# Patient Record
Sex: FEMALE | Race: OTHER | HISPANIC OR LATINO | URBAN - METROPOLITAN AREA
[De-identification: names, ages, dates, MRNs, and addresses within clinical notes are randomized per-mention and may not be internally consistent; named-entity substitution may affect disease eponyms.]

---

## 2020-09-09 ENCOUNTER — OFFICE VISIT (OUTPATIENT)
Dept: URBAN - METROPOLITAN AREA CLINIC 63 | Facility: CLINIC | Age: 50
End: 2020-09-09

## 2021-04-12 ENCOUNTER — OFFICE VISIT (OUTPATIENT)
Dept: URBAN - METROPOLITAN AREA CLINIC 63 | Facility: CLINIC | Age: 51
End: 2021-04-12

## 2021-06-10 ENCOUNTER — OFFICE VISIT (OUTPATIENT)
Dept: URBAN - METROPOLITAN AREA SURGERY CENTER 4 | Facility: SURGERY CENTER | Age: 51
End: 2021-06-10

## 2021-06-23 ENCOUNTER — OFFICE VISIT (OUTPATIENT)
Dept: URBAN - METROPOLITAN AREA CLINIC 63 | Facility: CLINIC | Age: 51
End: 2021-06-23

## 2021-07-08 ENCOUNTER — OFFICE VISIT (OUTPATIENT)
Dept: URBAN - METROPOLITAN AREA SURGERY CENTER 4 | Facility: SURGERY CENTER | Age: 51
End: 2021-07-08

## 2021-07-12 LAB — PATHOLOGY (INDENTED REPORT): (no result)

## 2021-08-11 ENCOUNTER — OFFICE VISIT (OUTPATIENT)
Dept: URBAN - METROPOLITAN AREA CLINIC 63 | Facility: CLINIC | Age: 51
End: 2021-08-11

## 2022-06-21 ENCOUNTER — OFFICE VISIT (OUTPATIENT)
Dept: URBAN - METROPOLITAN AREA TELEHEALTH 2 | Facility: TELEHEALTH | Age: 52
End: 2022-06-21

## 2022-07-09 ENCOUNTER — TELEPHONE ENCOUNTER (OUTPATIENT)
Dept: URBAN - METROPOLITAN AREA CLINIC 121 | Facility: CLINIC | Age: 52
End: 2022-07-09

## 2022-07-09 RX ORDER — FLECAINIDE ACETATE 50 MG/1
TABLET ORAL
Refills: 0 | OUTPATIENT
Start: 2017-08-16 | End: 2017-11-20

## 2022-07-09 RX ORDER — GABAPENTIN 300 MG/1
CAPSULE ORAL
Refills: 0 | OUTPATIENT
Start: 2018-11-28 | End: 2019-03-26

## 2022-07-09 RX ORDER — BACLOFEN 10 MG/1
TABLET ORAL
Refills: 0 | OUTPATIENT
Start: 2015-05-18 | End: 2017-03-27

## 2022-07-09 RX ORDER — HYDROCODONE BITARTRATE AND ACETAMINOPHEN 7.5; 325 MG/1; MG/1
TABLET ORAL
Refills: 0 | OUTPATIENT
Start: 2014-10-08 | End: 2015-05-18

## 2022-07-09 RX ORDER — HYDROCODONE BITARTRATE AND ACETAMINOPHEN 7.5; 325 MG/1; MG/1
TABLET ORAL
Refills: 0 | OUTPATIENT
Start: 2017-03-27 | End: 2017-05-23

## 2022-07-09 RX ORDER — OMEPRAZOLE 40 MG/1
TWICE A DAY CAPSULE, DELAYED RELEASE ORAL TWICE A DAY
Refills: 2 | OUTPATIENT
Start: 2015-05-12 | End: 2015-05-18

## 2022-07-09 RX ORDER — DEXLANSOPRAZOLE 60 MG/1
ONCE A DAY CAPSULE, DELAYED RELEASE ORAL ONCE A DAY
Refills: 1 | OUTPATIENT
Start: 2021-04-12 | End: 2021-10-14

## 2022-07-09 RX ORDER — SACCHAROMYCES BOULARDII 250 MG
CAPSULE ORAL ONCE A DAY
Refills: 0 | OUTPATIENT
Start: 2014-10-08 | End: 2015-05-18

## 2022-07-09 RX ORDER — BACLOFEN 10 MG/1
TABLET ORAL
Refills: 0 | OUTPATIENT
Start: 2017-05-23 | End: 2017-08-16

## 2022-07-09 RX ORDER — FLECAINIDE ACETATE 50 MG/1
TABLET ORAL
Refills: 0 | OUTPATIENT
Start: 2017-05-23 | End: 2017-08-16

## 2022-07-09 RX ORDER — ACYCLOVIR 200 MG/1
CAPSULE ORAL
Refills: 0 | OUTPATIENT
Start: 2017-03-27 | End: 2017-05-23

## 2022-07-09 RX ORDER — HYDROCODONE BITARTRATE AND ACETAMINOPHEN 7.5; 325 MG/1; MG/1
TABLET ORAL
Refills: 0 | OUTPATIENT
Start: 2014-07-08 | End: 2014-10-08

## 2022-07-09 RX ORDER — ACYCLOVIR 200 MG/1
CAPSULE ORAL
Refills: 0 | OUTPATIENT
Start: 2017-05-23 | End: 2017-08-16

## 2022-07-09 RX ORDER — PREDNISONE 20 MG/1
TABLET ORAL
Refills: 0 | OUTPATIENT
Start: 2017-11-20 | End: 2018-11-28

## 2022-07-09 RX ORDER — CLINDAMYCIN PHOSPHATE 10 MG/ML
LOTION TOPICAL
Refills: 0 | OUTPATIENT
Start: 2014-03-07 | End: 2014-07-08

## 2022-07-09 RX ORDER — HYDROCODONE BITARTRATE AND ACETAMINOPHEN 7.5; 325 MG/1; MG/1
TABLET ORAL
Refills: 0 | OUTPATIENT
Start: 2017-08-16 | End: 2017-11-20

## 2022-07-09 RX ORDER — FLECAINIDE ACETATE 50 MG/1
TABLET ORAL
Refills: 0 | OUTPATIENT
Start: 2014-07-08 | End: 2014-10-08

## 2022-07-09 RX ORDER — HYDROCODONE BITARTRATE AND ACETAMINOPHEN 7.5; 325 MG/1; MG/1
TABLET ORAL
Refills: 0 | OUTPATIENT
Start: 2017-05-23 | End: 2017-08-16

## 2022-07-09 RX ORDER — FLECAINIDE ACETATE 50 MG/1
TABLET ORAL
Refills: 0 | OUTPATIENT
Start: 2014-03-07 | End: 2014-07-08

## 2022-07-09 RX ORDER — HYDROCODONE BITARTRATE AND ACETAMINOPHEN 7.5; 325 MG/1; MG/1
TABLET ORAL
Refills: 0 | OUTPATIENT
Start: 2014-03-07 | End: 2014-07-08

## 2022-07-09 RX ORDER — PREDNISONE 50 MG/1
TABLET ORAL
Refills: 0 | OUTPATIENT
Start: 2014-07-08 | End: 2014-10-08

## 2022-07-09 RX ORDER — LISINOPRIL 10 MG/1
TABLET ORAL
Refills: 0 | OUTPATIENT
Start: 2017-11-20 | End: 2018-11-28

## 2022-07-09 RX ORDER — OMEPRAZOLE 20 MG/1
ONCE A DAY CAPSULE, DELAYED RELEASE ORAL ONCE A DAY
Refills: 3 | OUTPATIENT
Start: 2018-03-22 | End: 2018-08-20

## 2022-07-09 RX ORDER — METHOCARBAMOL 750 MG/1
TABLET, FILM COATED ORAL TWICE A DAY
Refills: 0 | OUTPATIENT
Start: 2017-10-26 | End: 2018-11-28

## 2022-07-09 RX ORDER — LOSARTAN POTASSIUM 100 MG/1
TABLET, FILM COATED ORAL
Refills: 0 | OUTPATIENT
Start: 2019-01-14 | End: 2019-03-26

## 2022-07-09 RX ORDER — CITALOPRAM 10 MG/1
TABLET ORAL
Refills: 0 | OUTPATIENT
Start: 2014-03-07 | End: 2014-07-08

## 2022-07-09 RX ORDER — PREDNISONE 20 MG/1
TABLET ORAL
Refills: 0 | OUTPATIENT
Start: 2018-11-28 | End: 2019-03-26

## 2022-07-09 RX ORDER — DEXLANSOPRAZOLE 60 MG/1
TAKE 1 CAPSULE BY MOUTH EVERY DAY CAPSULE, DELAYED RELEASE ORAL
Refills: 0 | OUTPATIENT
Start: 2022-01-17 | End: 2022-05-02

## 2022-07-09 RX ORDER — LISINOPRIL 10 MG/1
TABLET ORAL
Refills: 0 | OUTPATIENT
Start: 2014-10-08 | End: 2015-05-18

## 2022-07-09 RX ORDER — PREDNISONE 50 MG/1
TABLET ORAL
Refills: 0 | OUTPATIENT
Start: 2014-10-08 | End: 2015-05-18

## 2022-07-09 RX ORDER — ONDANSETRON 4 MG/1
TABLET, ORALLY DISINTEGRATING ORAL
Refills: 0 | OUTPATIENT
Start: 2014-10-08 | End: 2015-05-18

## 2022-07-09 RX ORDER — CLINDAMYCIN PHOSPHATE 10 MG/ML
LOTION TOPICAL
Refills: 0 | OUTPATIENT
Start: 2014-07-08 | End: 2014-10-08

## 2022-07-09 RX ORDER — ACYCLOVIR 200 MG/1
CAPSULE ORAL
Refills: 0 | OUTPATIENT
Start: 2015-05-18 | End: 2017-03-27

## 2022-07-09 RX ORDER — SIMVASTATIN 20 MG/1
TABLET, FILM COATED ORAL
Refills: 0 | OUTPATIENT
Start: 2018-11-28 | End: 2019-03-26

## 2022-07-09 RX ORDER — INSULIN GLARGINE 100 [IU]/ML
24N UNIT DAILY INJECTION, SOLUTION SUBCUTANEOUS
Refills: 0 | OUTPATIENT
Start: 2018-11-28 | End: 2019-03-26

## 2022-07-09 RX ORDER — MONTELUKAST 10 MG/1
TABLET, FILM COATED ORAL
Refills: 0 | OUTPATIENT
Start: 2014-10-08 | End: 2015-05-18

## 2022-07-09 RX ORDER — ONDANSETRON 4 MG/1
TABLET, ORALLY DISINTEGRATING ORAL
Refills: 0 | OUTPATIENT
Start: 2014-03-07 | End: 2014-07-08

## 2022-07-09 RX ORDER — DEXLANSOPRAZOLE 60 MG/1
TAKE 1 CAPSULE BY MOUTH EVERY DAY CAPSULE, DELAYED RELEASE ORAL ONCE A DAY
Refills: 1 | OUTPATIENT
Start: 2021-10-14 | End: 2021-11-18

## 2022-07-09 RX ORDER — LISINOPRIL 10 MG/1
TABLET ORAL
Refills: 0 | OUTPATIENT
Start: 2014-07-08 | End: 2014-10-08

## 2022-07-09 RX ORDER — MONTELUKAST 10 MG/1
TABLET, FILM COATED ORAL
Refills: 0 | OUTPATIENT
Start: 2018-11-28 | End: 2019-03-26

## 2022-07-09 RX ORDER — OMEPRAZOLE 20 MG/1
TAKE ONE CAPSULE BY MOUTH ONCE A DAY CAPSULE, DELAYED RELEASE ORAL
Refills: 0 | OUTPATIENT
Start: 2018-09-14 | End: 2018-10-14

## 2022-07-09 RX ORDER — LISINOPRIL 10 MG/1
TABLET ORAL
Refills: 0 | OUTPATIENT
Start: 2017-03-27 | End: 2017-05-23

## 2022-07-09 RX ORDER — OMEPRAZOLE 40 MG/1
CAPSULE, DELAYED RELEASE ORAL TWICE A DAY
Refills: 2 | OUTPATIENT
Start: 2014-05-27 | End: 2014-07-08

## 2022-07-09 RX ORDER — FLECAINIDE ACETATE 50 MG/1
TABLET ORAL
Refills: 0 | OUTPATIENT
Start: 2017-11-20 | End: 2018-11-28

## 2022-07-09 RX ORDER — DEXLANSOPRAZOLE 60 MG/1
ONCE A DAY CAPSULE, DELAYED RELEASE ORAL ONCE A DAY
Refills: 3 | OUTPATIENT
Start: 2019-03-26 | End: 2019-08-13

## 2022-07-09 RX ORDER — MONTELUKAST 10 MG/1
TABLET, FILM COATED ORAL
Refills: 0 | OUTPATIENT
Start: 2017-05-23 | End: 2017-08-16

## 2022-07-09 RX ORDER — OMEPRAZOLE 40 MG/1
TWICE A DAY CAPSULE, DELAYED RELEASE ORAL TWICE A DAY
Refills: 2 | OUTPATIENT
Start: 2015-05-18 | End: 2017-03-27

## 2022-07-09 RX ORDER — ISOSORBIDE DINITRATE 10 MG/1
TABLET ORAL
Refills: 0 | OUTPATIENT
Start: 2017-03-27 | End: 2017-05-23

## 2022-07-09 RX ORDER — ALPRAZOLAM 0.25 MG
TABLET ORAL
Refills: 0 | OUTPATIENT
Start: 2018-11-28 | End: 2019-03-26

## 2022-07-09 RX ORDER — LISINOPRIL 10 MG/1
TABLET ORAL
Refills: 0 | OUTPATIENT
Start: 2017-05-23 | End: 2017-08-16

## 2022-07-09 RX ORDER — BACLOFEN 10 MG/1
TABLET ORAL
Refills: 0 | OUTPATIENT
Start: 2014-03-07 | End: 2014-07-08

## 2022-07-09 RX ORDER — LISINOPRIL 10 MG/1
TABLET ORAL
Refills: 0 | OUTPATIENT
Start: 2017-08-16 | End: 2017-11-20

## 2022-07-09 RX ORDER — GLIMEPIRIDE 2 MG/1
TABLET ORAL
Refills: 0 | OUTPATIENT
Start: 2017-11-20 | End: 2018-11-28

## 2022-07-09 RX ORDER — ISOSORBIDE DINITRATE 10 MG/1
TABLET ORAL
Refills: 0 | OUTPATIENT
Start: 2017-05-23 | End: 2017-08-16

## 2022-07-09 RX ORDER — PAROXETINE HYDROCHLORIDE 20 MG/1
TABLET, FILM COATED ORAL
Refills: 0 | OUTPATIENT
Start: 2018-11-28 | End: 2019-03-26

## 2022-07-09 RX ORDER — AMOXICILLIN 500 MG/1
TAKE 2 TABLETS TWICE A DAY FOR 14 DAYS TABLET, FILM COATED ORAL TAKE AS DIRECTED
Refills: 0 | OUTPATIENT
Start: 2014-05-27 | End: 2014-07-08

## 2022-07-09 RX ORDER — HYDROCHLOROTHIAZIDE 12.5 MG/1
TABLET ORAL
Refills: 0 | OUTPATIENT
Start: 2018-11-16 | End: 2019-03-26

## 2022-07-09 RX ORDER — OMEPRAZOLE 20 MG/1
TAKE ONE CAPSULE BY MOUTH ONCE A DAY CAPSULE, DELAYED RELEASE ORAL
Refills: 0 | OUTPATIENT
Start: 2018-08-20 | End: 2018-09-14

## 2022-07-09 RX ORDER — METHOCARBAMOL 750 MG/1
TABLET, FILM COATED ORAL
Refills: 0 | OUTPATIENT
Start: 2018-02-15 | End: 2019-03-26

## 2022-07-09 RX ORDER — TRAZODONE HYDROCHLORIDE 100 MG/1
TABLET ORAL
Refills: 0 | OUTPATIENT
Start: 2014-07-08 | End: 2014-10-08

## 2022-07-09 RX ORDER — METFORMIN HCL 500 MG/1
TABLET ORAL TWICE A DAY
Refills: 0 | OUTPATIENT
Start: 2017-05-23 | End: 2017-08-16

## 2022-07-09 RX ORDER — DICLOFENAC SODIUM 1 %
GEL (GRAM) TOPICAL TWICE A DAY
Refills: 0 | OUTPATIENT
Start: 2017-10-27 | End: 2018-11-28

## 2022-07-09 RX ORDER — GLIMEPIRIDE 2 MG/1
TABLET ORAL
Refills: 0 | OUTPATIENT
Start: 2017-08-16 | End: 2017-11-20

## 2022-07-09 RX ORDER — DOXYCYCLINE HYCLATE 100 MG/1
CAPSULE ORAL
Refills: 0 | OUTPATIENT
Start: 2017-10-13 | End: 2018-11-28

## 2022-07-09 RX ORDER — SUCRALFATE 1 G/1
TABLET ORAL
Refills: 0 | OUTPATIENT
Start: 2018-11-28 | End: 2019-03-26

## 2022-07-09 RX ORDER — PREDNISONE 50 MG/1
TABLET ORAL
Refills: 0 | OUTPATIENT
Start: 2015-05-18 | End: 2017-03-27

## 2022-07-09 RX ORDER — CITALOPRAM 10 MG/1
TABLET ORAL
Refills: 0 | OUTPATIENT
Start: 2015-05-18 | End: 2017-03-27

## 2022-07-09 RX ORDER — ISOSORBIDE DINITRATE 10 MG/1
TABLET ORAL
Refills: 0 | OUTPATIENT
Start: 2014-10-08 | End: 2015-05-18

## 2022-07-09 RX ORDER — PREDNISONE 20 MG/1
TABLET ORAL
Refills: 0 | OUTPATIENT
Start: 2017-03-27 | End: 2017-05-23

## 2022-07-09 RX ORDER — OMEPRAZOLE 40 MG/1
CAPSULE, DELAYED RELEASE ORAL TWICE A DAY
Refills: 2 | OUTPATIENT
Start: 2014-07-08 | End: 2015-05-12

## 2022-07-09 RX ORDER — HYDROCODONE BITARTRATE AND ACETAMINOPHEN 7.5; 325 MG/1; MG/1
TABLET ORAL
Refills: 0 | OUTPATIENT
Start: 2017-11-20 | End: 2018-11-28

## 2022-07-09 RX ORDER — METFORMIN HCL 500 MG/1
TABLET ORAL TWICE A DAY
Refills: 0 | OUTPATIENT
Start: 2017-11-20 | End: 2018-11-28

## 2022-07-09 RX ORDER — GLIMEPIRIDE 2 MG/1
TABLET ORAL
Refills: 0 | OUTPATIENT
Start: 2017-05-23 | End: 2017-08-16

## 2022-07-09 RX ORDER — MONTELUKAST 10 MG/1
TABLET, FILM COATED ORAL
Refills: 0 | OUTPATIENT
Start: 2014-07-08 | End: 2014-10-08

## 2022-07-09 RX ORDER — GLIMEPIRIDE 2 MG/1
TABLET ORAL
Refills: 0 | OUTPATIENT
Start: 2014-03-07 | End: 2014-07-08

## 2022-07-09 RX ORDER — FLECAINIDE ACETATE 50 MG/1
TABLET ORAL
Refills: 0 | OUTPATIENT
Start: 2018-11-28 | End: 2019-03-26

## 2022-07-09 RX ORDER — NITROGLYCERIN 0.4 MG/1
PRN TABLET SUBLINGUAL
Refills: 0 | OUTPATIENT
Start: 2018-11-28 | End: 2019-03-26

## 2022-07-09 RX ORDER — ALPRAZOLAM 0.25 MG
TABLET ORAL
Refills: 0 | OUTPATIENT
Start: 2019-03-26 | End: 2020-09-09

## 2022-07-09 RX ORDER — OMEPRAZOLE 40 MG/1
CAPSULE, DELAYED RELEASE ORAL TWICE A DAY
Refills: 0 | OUTPATIENT
Start: 2018-11-26 | End: 2018-11-28

## 2022-07-09 RX ORDER — MULTIVITAMIN
TABLET ORAL
Refills: 0 | OUTPATIENT
Start: 2019-03-26 | End: 2020-09-09

## 2022-07-09 RX ORDER — GABAPENTIN 300 MG/1
CAPSULE ORAL
Refills: 0 | OUTPATIENT
Start: 2014-03-07 | End: 2014-07-08

## 2022-07-09 RX ORDER — PANTOPRAZOLE SODIUM 40 MG/1
TABLET, DELAYED RELEASE ORAL TWICE A DAY
Refills: 1 | OUTPATIENT
Start: 2014-10-08 | End: 2015-04-07

## 2022-07-09 RX ORDER — ALBUTEROL SULFATE 2.5 MG/3ML
SOLUTION RESPIRATORY (INHALATION)
Refills: 0 | OUTPATIENT
Start: 2018-11-28 | End: 2019-03-26

## 2022-07-09 RX ORDER — CITALOPRAM 10 MG/1
TABLET ORAL
Refills: 0 | OUTPATIENT
Start: 2014-10-08 | End: 2015-05-18

## 2022-07-09 RX ORDER — ISOSORBIDE DINITRATE 10 MG/1
TABLET ORAL
Refills: 0 | OUTPATIENT
Start: 2014-03-07 | End: 2014-07-08

## 2022-07-09 RX ORDER — GLIMEPIRIDE 2 MG/1
TABLET ORAL
Refills: 0 | OUTPATIENT
Start: 2015-05-18 | End: 2017-03-27

## 2022-07-09 RX ORDER — PREDNISONE 20 MG/1
TABLET ORAL
Refills: 0 | OUTPATIENT
Start: 2017-08-16 | End: 2017-11-20

## 2022-07-09 RX ORDER — DEXLANSOPRAZOLE 60 MG/1
TAKE 1 CAPSULE BY MOUTH EVERY DAY CAPSULE, DELAYED RELEASE ORAL
Refills: 0 | OUTPATIENT
Start: 2021-11-18 | End: 2022-01-17

## 2022-07-09 RX ORDER — ISOSORBIDE DINITRATE 10 MG/1
TABLET ORAL
Refills: 0 | OUTPATIENT
Start: 2015-05-18 | End: 2017-03-27

## 2022-07-09 RX ORDER — DEXLANSOPRAZOLE 60 MG/1
CAPSULE, DELAYED RELEASE ORAL ONCE A DAY
Refills: 2 | OUTPATIENT
Start: 2014-03-07 | End: 2014-07-08

## 2022-07-09 RX ORDER — SACCHAROMYCES BOULARDII 250 MG
CAPSULE ORAL ONCE A DAY
Refills: 1 | OUTPATIENT
Start: 2014-12-29 | End: 2015-05-18

## 2022-07-09 RX ORDER — DEXLANSOPRAZOLE 60 MG/1
ONCE A DAY CAPSULE, DELAYED RELEASE ORAL ONCE A DAY
Refills: 3 | OUTPATIENT
Start: 2018-11-28 | End: 2019-03-26

## 2022-07-09 RX ORDER — MONTELUKAST 10 MG/1
TABLET, FILM COATED ORAL
Refills: 0 | OUTPATIENT
Start: 2014-03-07 | End: 2014-07-08

## 2022-07-09 RX ORDER — CLINDAMYCIN PHOSPHATE 10 MG/ML
LOTION TOPICAL
Refills: 0 | OUTPATIENT
Start: 2014-10-08 | End: 2015-05-18

## 2022-07-09 RX ORDER — HYDROCODONE BITARTRATE AND ACETAMINOPHEN 7.5; 325 MG/1; MG/1
TABLET ORAL
Refills: 0 | OUTPATIENT
Start: 2015-05-18 | End: 2017-03-27

## 2022-07-09 RX ORDER — METFORMIN HCL 500 MG/1
TABLET ORAL TWICE A DAY
Refills: 0 | OUTPATIENT
Start: 2017-03-27 | End: 2017-05-23

## 2022-07-09 RX ORDER — CLARITHROMYCIN 500 MG/1
TABLET ORAL TWICE A DAY
Refills: 0 | OUTPATIENT
Start: 2014-05-27 | End: 2014-07-08

## 2022-07-09 RX ORDER — CLOTRIMAZOLE 10 MG/G
CREAM TOPICAL TWICE A DAY
Refills: 0 | OUTPATIENT
Start: 2018-11-28 | End: 2019-03-26

## 2022-07-09 RX ORDER — GABAPENTIN 300 MG/1
CAPSULE ORAL
Refills: 0 | OUTPATIENT
Start: 2015-05-18 | End: 2017-03-27

## 2022-07-09 RX ORDER — BACLOFEN 10 MG/1
TABLET ORAL
Refills: 0 | OUTPATIENT
Start: 2017-03-27 | End: 2017-05-23

## 2022-07-09 RX ORDER — ACYCLOVIR 200 MG/1
CAPSULE ORAL
Refills: 0 | OUTPATIENT
Start: 2017-08-16 | End: 2017-08-16

## 2022-07-09 RX ORDER — LISINOPRIL 10 MG/1
TABLET ORAL
Refills: 0 | OUTPATIENT
Start: 2014-03-07 | End: 2014-07-08

## 2022-07-09 RX ORDER — SUCRALFATE 1 G/1
TWICE A DAY TABLET ORAL TWICE A DAY
Refills: 3 | OUTPATIENT
Start: 2017-05-23 | End: 2017-08-16

## 2022-07-09 RX ORDER — GLIMEPIRIDE 2 MG/1
TABLET ORAL
Refills: 0 | OUTPATIENT
Start: 2014-10-08 | End: 2015-05-18

## 2022-07-09 RX ORDER — OMEPRAZOLE 20 MG/1
PRN CAPSULE, DELAYED RELEASE ORAL
Refills: 0 | OUTPATIENT
Start: 2017-03-27 | End: 2017-05-23

## 2022-07-09 RX ORDER — LISINOPRIL 10 MG/1
TABLET ORAL
Refills: 0 | OUTPATIENT
Start: 2018-11-28 | End: 2019-03-26

## 2022-07-09 RX ORDER — BACLOFEN 10 MG/1
TABLET ORAL
Refills: 0 | OUTPATIENT
Start: 2014-10-08 | End: 2015-05-18

## 2022-07-09 RX ORDER — FLECAINIDE ACETATE 50 MG/1
TABLET ORAL
Refills: 0 | OUTPATIENT
Start: 2014-10-08 | End: 2015-05-18

## 2022-07-09 RX ORDER — GABAPENTIN 300 MG/1
CAPSULE ORAL
Refills: 0 | OUTPATIENT
Start: 2014-07-08 | End: 2014-10-08

## 2022-07-09 RX ORDER — FAMOTIDINE 20 MG/1
TABLET ORAL TWICE A DAY
Refills: 0 | OUTPATIENT
Start: 2018-11-28 | End: 2019-03-26

## 2022-07-09 RX ORDER — METFORMIN HCL 500 MG/1
TABLET ORAL TWICE A DAY
Refills: 0 | OUTPATIENT
Start: 2017-08-16 | End: 2017-11-20

## 2022-07-09 RX ORDER — ACYCLOVIR 200 MG/1
CAPSULE ORAL
Refills: 0 | OUTPATIENT
Start: 2014-03-07 | End: 2014-07-08

## 2022-07-09 RX ORDER — ACYCLOVIR 200 MG/1
FIVE TIMES A DAY CAPSULE ORAL
Refills: 0 | OUTPATIENT
Start: 2018-11-28 | End: 2019-03-26

## 2022-07-09 RX ORDER — MONTELUKAST 10 MG/1
TABLET, FILM COATED ORAL
Refills: 0 | OUTPATIENT
Start: 2017-08-16 | End: 2018-11-28

## 2022-07-09 RX ORDER — FLECAINIDE ACETATE 50 MG/1
TABLET ORAL
Refills: 0 | OUTPATIENT
Start: 2015-05-18 | End: 2017-03-27

## 2022-07-09 RX ORDER — TRAZODONE HYDROCHLORIDE 100 MG/1
TABLET ORAL
Refills: 0 | OUTPATIENT
Start: 2015-05-18 | End: 2017-03-27

## 2022-07-09 RX ORDER — METOPROLOL TARTRATE 25 MG/1
TABLET, FILM COATED ORAL TWICE A DAY
Refills: 0 | OUTPATIENT
Start: 2018-11-28 | End: 2019-03-26

## 2022-07-09 RX ORDER — PANTOPRAZOLE SODIUM 40 MG/1
TABLET, DELAYED RELEASE ORAL
Refills: 0 | OUTPATIENT
Start: 2019-03-26 | End: 2019-03-26

## 2022-07-09 RX ORDER — GABAPENTIN 300 MG/1
CAPSULE ORAL
Refills: 0 | OUTPATIENT
Start: 2014-10-08 | End: 2015-05-18

## 2022-07-09 RX ORDER — PANTOPRAZOLE SODIUM 40 MG/1
TWICE A DAY TABLET, DELAYED RELEASE ORAL TWICE A DAY
Refills: 1 | OUTPATIENT
Start: 2015-04-07 | End: 2015-05-18

## 2022-07-09 RX ORDER — OMEPRAZOLE 40 MG/1
CAPSULE, DELAYED RELEASE ORAL TWICE A DAY
Refills: 0 | OUTPATIENT
Start: 2017-11-20 | End: 2018-11-26

## 2022-07-09 RX ORDER — BACLOFEN 10 MG/1
TABLET ORAL
Refills: 0 | OUTPATIENT
Start: 2014-07-08 | End: 2014-10-08

## 2022-07-09 RX ORDER — ISOSORBIDE DINITRATE 10 MG/1
TABLET ORAL
Refills: 0 | OUTPATIENT
Start: 2017-08-16 | End: 2017-11-20

## 2022-07-09 RX ORDER — OMEPRAZOLE 20 MG/1
ONCE A DAY CAPSULE, DELAYED RELEASE ORAL ONCE A DAY
Refills: 2 | OUTPATIENT
Start: 2015-07-10 | End: 2015-10-12

## 2022-07-09 RX ORDER — CITALOPRAM 10 MG/1
TABLET ORAL
Refills: 0 | OUTPATIENT
Start: 2014-07-08 | End: 2014-10-08

## 2022-07-09 RX ORDER — MONTELUKAST 10 MG/1
TABLET, FILM COATED ORAL
Refills: 0 | OUTPATIENT
Start: 2015-05-18 | End: 2017-05-23

## 2022-07-09 RX ORDER — SUCRALFATE 1 G/1
TABLET ORAL TWICE A DAY
Refills: 0 | OUTPATIENT
Start: 2017-08-16 | End: 2017-11-20

## 2022-07-09 RX ORDER — LISINOPRIL 10 MG/1
TABLET ORAL
Refills: 0 | OUTPATIENT
Start: 2015-05-18 | End: 2017-03-27

## 2022-07-09 RX ORDER — ONDANSETRON 4 MG/1
TABLET, ORALLY DISINTEGRATING ORAL
Refills: 0 | OUTPATIENT
Start: 2015-05-18 | End: 2017-03-27

## 2022-07-09 RX ORDER — TRAZODONE HYDROCHLORIDE 100 MG/1
TABLET ORAL
Refills: 0 | OUTPATIENT
Start: 2014-03-07 | End: 2014-07-08

## 2022-07-09 RX ORDER — BACLOFEN 10 MG/1
TABLET ORAL
Refills: 0 | OUTPATIENT
Start: 2017-08-16 | End: 2017-11-20

## 2022-07-09 RX ORDER — PREDNISONE 50 MG/1
TABLET ORAL
Refills: 0 | OUTPATIENT
Start: 2014-03-07 | End: 2014-07-08

## 2022-07-09 RX ORDER — GLIMEPIRIDE 2 MG/1
TABLET ORAL
Refills: 0 | OUTPATIENT
Start: 2014-07-08 | End: 2014-10-08

## 2022-07-09 RX ORDER — TRAZODONE HYDROCHLORIDE 100 MG/1
TABLET ORAL
Refills: 0 | OUTPATIENT
Start: 2014-10-08 | End: 2015-05-18

## 2022-07-09 RX ORDER — ISOSORBIDE DINITRATE 10 MG/1
TABLET ORAL
Refills: 0 | OUTPATIENT
Start: 2014-07-08 | End: 2014-10-08

## 2022-07-09 RX ORDER — PREDNISONE 20 MG/1
TABLET ORAL
Refills: 0 | OUTPATIENT
Start: 2017-05-23 | End: 2017-08-16

## 2022-07-09 RX ORDER — HYDROCHLOROTHIAZIDE 12.5 MG/1
TABLET ORAL
Refills: 0 | OUTPATIENT
Start: 2018-11-28 | End: 2019-03-26

## 2022-07-09 RX ORDER — OMEPRAZOLE 20 MG/1
CAPSULE, DELAYED RELEASE ORAL TWICE A DAY
Refills: 2 | OUTPATIENT
Start: 2014-12-29 | End: 2015-04-06

## 2022-07-09 RX ORDER — OMEPRAZOLE 20 MG/1
PRN CAPSULE, DELAYED RELEASE ORAL
Refills: 0 | OUTPATIENT
Start: 2017-08-16 | End: 2017-11-20

## 2022-07-09 RX ORDER — BACLOFEN 10 MG/1
TABLET ORAL
Refills: 0 | OUTPATIENT
Start: 2017-11-20 | End: 2018-11-28

## 2022-07-09 RX ORDER — OMEPRAZOLE 20 MG/1
PRN CAPSULE, DELAYED RELEASE ORAL
Refills: 0 | OUTPATIENT
Start: 2017-05-23 | End: 2017-08-16

## 2022-07-09 RX ORDER — OXYCODONE AND ACETAMINOPHEN 325; 10 MG/1; MG/1
TABLET ORAL
Refills: 0 | OUTPATIENT
Start: 2018-11-28 | End: 2019-03-26

## 2022-07-09 RX ORDER — INSULIN ASPART 100 [IU]/ML
PRN INJECTION, SOLUTION INTRAVENOUS; SUBCUTANEOUS
Refills: 0 | OUTPATIENT
Start: 2018-11-28 | End: 2019-03-26

## 2022-07-09 RX ORDER — GLIMEPIRIDE 2 MG/1
TABLET ORAL
Refills: 0 | OUTPATIENT
Start: 2017-03-27 | End: 2017-05-23

## 2022-07-09 RX ORDER — ONDANSETRON 4 MG/1
TABLET, ORALLY DISINTEGRATING ORAL
Refills: 0 | OUTPATIENT
Start: 2014-07-08 | End: 2014-10-08

## 2022-07-09 RX ORDER — SUCRALFATE 1 G/1
TABLET ORAL
Refills: 0 | OUTPATIENT
Start: 2014-07-08 | End: 2015-05-18

## 2022-07-09 RX ORDER — FLECAINIDE ACETATE 50 MG/1
TABLET ORAL
Refills: 0 | OUTPATIENT
Start: 2017-03-27 | End: 2017-05-23

## 2022-07-09 RX ORDER — OMEPRAZOLE 20 MG/1
ONCE A DAY CAPSULE, DELAYED RELEASE ORAL ONCE A DAY
Refills: 2 | OUTPATIENT
Start: 2015-10-12 | End: 2017-03-27

## 2022-07-10 ENCOUNTER — TELEPHONE ENCOUNTER (OUTPATIENT)
Dept: URBAN - METROPOLITAN AREA CLINIC 121 | Facility: CLINIC | Age: 52
End: 2022-07-10

## 2022-07-10 RX ORDER — FAMOTIDINE 20 MG/1
TABLET ORAL TWICE A DAY
Refills: 0 | Status: ACTIVE | COMMUNITY
Start: 2019-03-26

## 2022-07-10 RX ORDER — SIMVASTATIN 20 MG/1
TABLET, FILM COATED ORAL
Refills: 0 | Status: ACTIVE | COMMUNITY
Start: 2019-03-26

## 2022-07-10 RX ORDER — ESOMEPRAZOLE MAGNESIUM 40 MG/1
ONCE A DAY CAPSULE, DELAYED RELEASE ORAL ONCE A DAY
Refills: 0 | Status: ACTIVE | COMMUNITY
Start: 2022-05-02

## 2022-07-10 RX ORDER — DEXLANSOPRAZOLE 60 MG/1
TAKE 1 CAPSULE BY MOUTH EVERY DAY CAPSULE, DELAYED RELEASE ORAL
Refills: 0 | Status: ACTIVE | COMMUNITY
Start: 2019-10-24

## 2022-07-10 RX ORDER — FLUCONAZOLE 100 MG/1
ONCE A DAY TABLET ORAL ONCE A DAY
Refills: 0 | Status: ACTIVE | COMMUNITY
Start: 2017-05-23

## 2022-07-10 RX ORDER — OMEPRAZOLE 40 MG/1
ONCE A DAY CAPSULE, DELAYED RELEASE ORAL ONCE A DAY
Refills: 2 | Status: ACTIVE | COMMUNITY
Start: 2022-06-21

## 2022-07-10 RX ORDER — DEXLANSOPRAZOLE 60 MG/1
TAKE 1 CAPSULE BY MOUTH EVERY DAY CAPSULE, DELAYED RELEASE ORAL
Refills: 0 | Status: ACTIVE | COMMUNITY
Start: 2019-08-13

## 2022-07-10 RX ORDER — CLOTRIMAZOLE 10 MG/G
CREAM TOPICAL TWICE A DAY
Refills: 0 | Status: ACTIVE | COMMUNITY
Start: 2019-03-26

## 2022-07-10 RX ORDER — METHOCARBAMOL 750 MG/1
TABLET, FILM COATED ORAL
Refills: 0 | Status: ACTIVE | COMMUNITY
Start: 2019-03-26

## 2022-07-10 RX ORDER — SUCRALFATE 1 G/1
TWICE A DAY TABLET ORAL TWICE A DAY
Refills: 0 | Status: ACTIVE | COMMUNITY
Start: 2017-08-16

## 2022-07-10 RX ORDER — INSULIN ASPART 100 [IU]/ML
PRN INJECTION, SOLUTION INTRAVENOUS; SUBCUTANEOUS
Refills: 0 | Status: ACTIVE | COMMUNITY
Start: 2019-03-26

## 2022-07-10 RX ORDER — FAMOTIDINE 10 MG
ONCE A DAY TABLET ORAL ONCE A DAY
Refills: 3 | Status: ACTIVE | COMMUNITY
Start: 2019-03-26

## 2022-07-10 RX ORDER — DEXLANSOPRAZOLE 30 MG/1
ONCE A DAY CAPSULE, DELAYED RELEASE ORAL ONCE A DAY
Refills: 1 | Status: ACTIVE | COMMUNITY
Start: 2020-09-09

## 2022-07-10 RX ORDER — FLECAINIDE ACETATE 50 MG/1
TABLET ORAL
Refills: 0 | Status: ACTIVE | COMMUNITY
Start: 2019-03-26

## 2022-07-10 RX ORDER — DEXLANSOPRAZOLE 60 MG/1
ONCE A DAY CAPSULE, DELAYED RELEASE ORAL ONCE A DAY
Refills: 0 | Status: ACTIVE | COMMUNITY
Start: 2022-05-02

## 2022-07-10 RX ORDER — LOSARTAN POTASSIUM 100 MG/1
TABLET, FILM COATED ORAL
Refills: 0 | Status: ACTIVE | COMMUNITY
Start: 2019-03-26

## 2022-07-10 RX ORDER — INSULIN GLARGINE 100 [IU]/ML
24N UNIT DAILY INJECTION, SOLUTION SUBCUTANEOUS
Refills: 0 | Status: ACTIVE | COMMUNITY
Start: 2019-03-26

## 2022-07-10 RX ORDER — METHOCARBAMOL 750 MG/1
TABLET, FILM COATED ORAL TWICE A DAY
Refills: 0 | Status: ACTIVE | COMMUNITY
Start: 2018-11-28

## 2022-07-10 RX ORDER — FLUCONAZOLE 200 MG/1
ONCE A DAY TABLET ORAL ONCE A DAY
Refills: 0 | Status: ACTIVE | COMMUNITY
Start: 2017-05-23

## 2022-07-10 RX ORDER — OMEPRAZOLE 20 MG/1
ONCE A DAY CAPSULE, DELAYED RELEASE ORAL ONCE A DAY
Refills: 0 | Status: ACTIVE | COMMUNITY
Start: 2022-05-03

## 2022-07-10 RX ORDER — GABAPENTIN 300 MG/1
CAPSULE ORAL
Refills: 0 | Status: ACTIVE | COMMUNITY
Start: 2019-03-26

## 2022-07-10 RX ORDER — PREDNISONE 20 MG/1
TABLET ORAL
Refills: 0 | Status: ACTIVE | COMMUNITY
Start: 2019-03-26

## 2022-07-10 RX ORDER — ACYCLOVIR 200 MG/1
FIVE TIMES A DAY CAPSULE ORAL
Refills: 0 | Status: ACTIVE | COMMUNITY
Start: 2019-03-26

## 2022-07-10 RX ORDER — NITROGLYCERIN 0.4 MG/1
PRN TABLET SUBLINGUAL
Refills: 0 | Status: ACTIVE | COMMUNITY
Start: 2019-03-26

## 2022-07-10 RX ORDER — PAROXETINE HYDROCHLORIDE 20 MG/1
TABLET, FILM COATED ORAL
Refills: 0 | Status: ACTIVE | COMMUNITY
Start: 2019-03-26

## 2022-07-10 RX ORDER — MONTELUKAST 10 MG/1
TABLET, FILM COATED ORAL
Refills: 0 | Status: ACTIVE | COMMUNITY
Start: 2019-03-26

## 2022-07-10 RX ORDER — ALBUTEROL SULFATE 2.5 MG/3ML
SOLUTION RESPIRATORY (INHALATION)
Refills: 0 | Status: ACTIVE | COMMUNITY
Start: 2019-03-26

## 2022-07-10 RX ORDER — OMEPRAZOLE 20 MG/1
TAKE ONE CAPSULE BY MOUTH ONCE A DAY CAPSULE, DELAYED RELEASE ORAL
Refills: 0 | Status: ACTIVE | COMMUNITY
Start: 2018-10-14

## 2022-07-10 RX ORDER — OXYCODONE AND ACETAMINOPHEN 325; 10 MG/1; MG/1
TABLET ORAL
Refills: 0 | Status: ACTIVE | COMMUNITY
Start: 2019-03-26

## 2022-07-10 RX ORDER — HYDROCHLOROTHIAZIDE 12.5 MG/1
TABLET ORAL
Refills: 0 | Status: ACTIVE | COMMUNITY
Start: 2019-03-26

## 2022-07-27 ENCOUNTER — TELEPHONE ENCOUNTER (OUTPATIENT)
Dept: URBAN - METROPOLITAN AREA CLINIC 63 | Facility: CLINIC | Age: 52
End: 2022-07-27

## 2022-08-23 ENCOUNTER — OFFICE VISIT (OUTPATIENT)
Dept: URBAN - METROPOLITAN AREA TELEHEALTH 1 | Facility: TELEHEALTH | Age: 52
End: 2022-08-23
Payer: COMMERCIAL

## 2022-08-23 DIAGNOSIS — K29.60 REFLUX GASTRITIS: ICD-10-CM

## 2022-08-23 DIAGNOSIS — Z86.010 PERSONAL HISTORY OF COLONIC POLYPS: ICD-10-CM

## 2022-08-23 DIAGNOSIS — D12.6 BENIGN NEOPLASM OF COLON, UNSPECIFIED: ICD-10-CM

## 2022-08-23 PROCEDURE — 99213 OFFICE O/P EST LOW 20 MIN: CPT | Performed by: INTERNAL MEDICINE

## 2022-08-23 RX ORDER — MONTELUKAST 10 MG/1
TABLET, FILM COATED ORAL
Refills: 0 | Status: ACTIVE | COMMUNITY
Start: 2019-03-26

## 2022-08-23 RX ORDER — METHOCARBAMOL 750 MG/1
TABLET, FILM COATED ORAL TWICE A DAY
Refills: 0 | Status: ACTIVE | COMMUNITY
Start: 2018-11-28

## 2022-08-23 RX ORDER — OMEPRAZOLE 40 MG/1
1 CAPSULE 30 MINUTES BEFORE MORNING MEAL CAPSULE, DELAYED RELEASE ORAL
Qty: 60 | Refills: 1 | OUTPATIENT

## 2022-08-23 RX ORDER — ALBUTEROL SULFATE 2.5 MG/3ML
SOLUTION RESPIRATORY (INHALATION)
Refills: 0 | Status: ACTIVE | COMMUNITY
Start: 2019-03-26

## 2022-08-23 RX ORDER — SIMVASTATIN 20 MG/1
TABLET, FILM COATED ORAL
Refills: 0 | Status: ACTIVE | COMMUNITY
Start: 2019-03-26

## 2022-08-23 RX ORDER — PREDNISONE 20 MG/1
TABLET ORAL
Refills: 0 | Status: ACTIVE | COMMUNITY
Start: 2019-03-26

## 2022-08-23 RX ORDER — PAROXETINE HYDROCHLORIDE 20 MG/1
TABLET, FILM COATED ORAL
Refills: 0 | Status: ACTIVE | COMMUNITY
Start: 2019-03-26

## 2022-08-23 RX ORDER — LOSARTAN POTASSIUM 100 MG/1
TABLET, FILM COATED ORAL
Refills: 0 | Status: ACTIVE | COMMUNITY
Start: 2019-03-26

## 2022-08-23 RX ORDER — NITROGLYCERIN 0.4 MG/1
PRN TABLET SUBLINGUAL
Refills: 0 | Status: ACTIVE | COMMUNITY
Start: 2019-03-26

## 2022-08-23 RX ORDER — FLECAINIDE ACETATE 50 MG/1
TABLET ORAL
Refills: 0 | Status: ACTIVE | COMMUNITY
Start: 2019-03-26

## 2022-08-23 RX ORDER — OXYCODONE AND ACETAMINOPHEN 325; 10 MG/1; MG/1
TABLET ORAL
Refills: 0 | Status: ACTIVE | COMMUNITY
Start: 2019-03-26

## 2022-08-23 RX ORDER — OMEPRAZOLE 40 MG/1
ONCE A DAY CAPSULE, DELAYED RELEASE ORAL ONCE A DAY
Refills: 2 | Status: ACTIVE | COMMUNITY
Start: 2022-06-21

## 2022-08-23 RX ORDER — OMEPRAZOLE 40 MG/1
1 CAPSULE 30 MINUTES BEFORE MORNING MEAL CAPSULE, DELAYED RELEASE ORAL TWICE A DAY
Qty: 60 | Refills: 1 | OUTPATIENT
Start: 2022-08-23

## 2022-08-23 RX ORDER — HYDROCHLOROTHIAZIDE 12.5 MG/1
TABLET ORAL
Refills: 0 | Status: ACTIVE | COMMUNITY
Start: 2019-03-26

## 2022-08-23 RX ORDER — CLOTRIMAZOLE 10 MG/G
CREAM TOPICAL TWICE A DAY
Refills: 0 | Status: ACTIVE | COMMUNITY
Start: 2019-03-26

## 2022-08-23 RX ORDER — INSULIN GLARGINE 100 [IU]/ML
24N UNIT DAILY INJECTION, SOLUTION SUBCUTANEOUS
Refills: 0 | Status: ACTIVE | COMMUNITY
Start: 2019-03-26

## 2022-08-23 RX ORDER — GABAPENTIN 300 MG/1
CAPSULE ORAL
Refills: 0 | Status: ACTIVE | COMMUNITY
Start: 2019-03-26

## 2022-08-23 RX ORDER — ACYCLOVIR 200 MG/1
FIVE TIMES A DAY CAPSULE ORAL
Refills: 0 | Status: ACTIVE | COMMUNITY
Start: 2019-03-26

## 2022-08-23 RX ORDER — FAMOTIDINE 20 MG/1
TABLET ORAL TWICE A DAY
Refills: 0 | Status: ACTIVE | COMMUNITY
Start: 2019-03-26

## 2022-08-23 RX ORDER — INSULIN ASPART 100 [IU]/ML
PRN INJECTION, SOLUTION INTRAVENOUS; SUBCUTANEOUS
Refills: 0 | Status: ACTIVE | COMMUNITY
Start: 2019-03-26

## 2022-08-23 NOTE — HPI-TODAY'S VISIT:
Patient seen today via telehealth by agreement and consent of patient in light of current COVID-19 pandemic. I used video conferencing during the visit. The patient encounter is appropriate and reasonable under the circumstances given the patient's particular presentation at this time. The patient has been advised of the followin) the potential risks and limitations of this mode of treatment (including but not limited to the absence of in-person examination); 2) the right to refuse telehealth services at any point without affecting the right to future care; 3) the right to receive in-person services, included immediately after this consultation if an urgent need arises; 4) information, including identifiable images or information from this telehealth consult, will only be shared in accordance with HIPPA regulations. Any and all of the patient's and/or patient's family member's questions on this issue have been answered. The patient has verbally consented to be treated via telehealth services. The patient has also been advised to contact this office for worsening conditions or problems, and seek emergency medical treatment and/or call 911 if the patient deems either necessary.   More than half of the face-to-face time used for counseling and coordination of care.  The patient received telehealth services at: home  Patient with chronic GERD for the last year  with dysphagia for the last 2 years is intermittent and to solid and liquid.  Patient taking dexilant 60 mg po daily working better patient on prednisone  because of asthma will do EGD with possible dilation will need to r/o esophagitis.  : Patient with evidence of reflux and gastritis positive for a bacteria likely H pylori will start triple treatment.  Patient denies alleries to PNC will start amoxicillin and clarithromycin. Will follow in 2 months.  : Patient had triple treatment but patient stopped omeprazole after 14 days, Patient with epigastric pain andocassional vomiting.  Patient need to start treatment. with PPI and will start carafte.   Will follow in 3 months will need a evaluation by Psychiatry states try to take her life, I ask her today an she denies any suicidal ideation at this moment but need a psychiatry evaluation as soon as possible. Patient looks stable I called Dr Samy Holguin who will see the patient soon. Patient denies any suicidal ideation and states she need to live because her family depend on her.   10/14: Patient persist with GERD and epigastric pain, the insurance did not cover the PPI. Will start pantoprazole and will follow in 2 months. Will do start also probiotics.  : Patient with GERD well control with PPI, will try again with the insurance.   With some improvement with probiotics, will continue treatment.  Will follow as needed basis.  5/15: patient state was doing well but  since came from [place] has dyspepsia and diarrhea will do stool studies and will start probitotics, will follow in 2 weeks.  7/15: patient 2 months after last visit , stool studies were negative, Patient  with reflux and epigastric pain with no treatment for the last month will restart omeprazole 20 mg once a day. Will follow in 3 months.  10/15: Patient feels better, on omeprazole. As per patient denies GI symptoms on omeprazole,  her insurance does not give her PPI.  Patient needs to continue PPI daily for now will use low dose of 20 mg once a day. Patient with occasional loose stool will start probiotics.  3/17:  Patient comes after more than [place] year doing well until she went to [place]  with diarrhea and abdominal pain , now doing better, diarrhea was resolved, will do EGD as per patient was evaluated in [place] and was told that had a gastric mass. Will do EGD. Patient with rectal irritation Will start topical treatment Will consider colonoscopy. Patient is with [employer] [employer]. discussion [Use for free text]. Discussed the need for appropriate follow-up care.  Patient will be placed in our recall system and a letter will be mailed to the patient. Discussed dietary restrictions pertaining to Gastritis Information sheet reviewed and a copy provided.    Patient with symptoms of gastritis and reflux and dysphagia. EGD with evidence of esophageal moniliasis and chronic gastritis. Colonoscopy with evidence of one tubular adenoma and two hyperplastic polyps and internal hemorrhoids. Abdomen US with evidence of fatty liver and hepatomegaly. Plan Patient Hx of chronic asthma on steroids inhaler educate to rinse her moth several times after each treatment and not swallow that water. Patient will do treatment with fluconazole. Carafate, and PPI. Colon recall in 5 year.  Patient with Hx of  esophagitis by Candida, today with the complaints of asthma attack, on chronic use of steroids, diabetes, hypertension. She said symptoms of gastritis, reflux, and dysphagia are better. Patient will continue with PPI and Carafate. Will evaluate her and next visit for EGD if symptoms do not improve. Also Patient with medical history of a recently ultrasound with evidence of hepatomegaly and fatty liver, Liver enzymes are normal Lipid panel is normal. She will have Hepatitis pane, HgbA1c, and AFP.  : Patient had labs with positive AB for HAV, HCV and HBV are negative. Patient with recent infection with Pseudomonas with pneumonia that need to be in ICU few months ago. Patient with fatty liver. Will need diet and weight loss. Will need to decrease steroids if is possible, Will continue with similar treatment, will do Labs and ultrasound in 6 months.  Patient here complaining of resent admission to the hospital due to diverticulitis. Today with complaints of diarrhea, no blood or mucus, no fever, discomfort over th LLQ. Patient with history of fatty liver. Also complain t of Reflux and gastritis with epigastric pain. Patient in long term use of steroids. Her last EGD and colonoscopy was a year ago. Patient will do plan below, she was instructed to call to this office or go to ER Department if any change on her health status. 3/19 Patient here after been discharge from the hospital with respiratory failure and lower back surgery s/p treatment with antibiotics for MRSA and pseudomonas infection. Today complaining of acid reflux and gastritis symptoms. labs done two month ago are negative for hepatitis, elevated AST. Patient will do diet she will continue with Dexilant, and will start Ranitidine at bed time for one month.  : Patient comes after UTI and PNA one month ago, now doing better, will have neck surgery. Will follow in 3 months, and will consider EGD and will do short trial with Dexilant for her reflux. Will need to continue well control of her glucose. Will try to decrease dose of PPI.   : Patient had neck surgery as per patient had stroke with left hemiparesis that resolved. As per patient was related to stress, will need records from neurology. Patient doing well on dexilant. Patient will need cardiac clearance before EGD.  CT scan of abdomen and pelvis done in 2020 with impression of fundoplication, status post cholecystectomy, small benign left renal cyst, otherwise unremarkable. : Patient needed a repeat surgery in her back, in her lower back, patient with bloating and abdominal pain in the lower extremity. Will plan CT scan of abdomen and pelvic and will plan colonoscopy in next visit in 2 months. Will start PPI. If Ct scan is normal will plan colonoscopy, patient with personal h/o colon polyps.  : Patient had a CT scan with evidence of no acute on normalities in the abdomen or pelvis, there is mild hepatomegaly with diffuse hepatic steatosis, will need to do work-up for fatty liver, small hiatal hernia, s/p cholecystectomy, s/p hysterectomy, 1 cm simple cyst in the left kidney, diverticulosis and is s/p L5-S1 fusion.  Patient complaining of heartburn that is more severe even taking omeprazole once a day, will increase dose to omeprazole twice a day and will do an upper endoscopy and colonoscopy.

## 2022-08-26 ENCOUNTER — TELEPHONE ENCOUNTER (OUTPATIENT)
Dept: URBAN - METROPOLITAN AREA CLINIC 63 | Facility: CLINIC | Age: 52
End: 2022-08-26

## 2022-08-26 RX ORDER — OMEPRAZOLE 40 MG/1
1 CAPSULE 30 MINUTES BEFORE MORNING MEAL CAPSULE, DELAYED RELEASE ORAL TWICE A DAY
Qty: 60 | Refills: 1
Start: 2022-08-23

## 2022-08-30 ENCOUNTER — LAB OUTSIDE AN ENCOUNTER (OUTPATIENT)
Dept: URBAN - METROPOLITAN AREA TELEHEALTH 1 | Facility: TELEHEALTH | Age: 52
End: 2022-08-30

## 2022-09-22 PROBLEM — 57433008: Status: ACTIVE | Noted: 2022-09-22

## 2022-09-29 ENCOUNTER — OFFICE VISIT (OUTPATIENT)
Dept: URBAN - METROPOLITAN AREA SURGERY CENTER 4 | Facility: SURGERY CENTER | Age: 52
End: 2022-09-29

## 2022-11-01 ENCOUNTER — OFFICE VISIT (OUTPATIENT)
Dept: URBAN - METROPOLITAN AREA TELEHEALTH 1 | Facility: TELEHEALTH | Age: 52
End: 2022-11-01

## 2022-11-30 ENCOUNTER — TELEPHONE ENCOUNTER (OUTPATIENT)
Dept: URBAN - METROPOLITAN AREA CLINIC 63 | Facility: CLINIC | Age: 52
End: 2022-11-30

## 2022-12-01 ENCOUNTER — CLAIMS CREATED FROM THE CLAIM WINDOW (OUTPATIENT)
Dept: URBAN - METROPOLITAN AREA CLINIC 4 | Facility: CLINIC | Age: 52
End: 2022-12-01
Payer: COMMERCIAL

## 2022-12-01 ENCOUNTER — CLAIMS CREATED FROM THE CLAIM WINDOW (OUTPATIENT)
Dept: URBAN - METROPOLITAN AREA SURGERY CENTER 4 | Facility: SURGERY CENTER | Age: 52
End: 2022-12-01
Payer: COMMERCIAL

## 2022-12-01 DIAGNOSIS — D12.4 POLYP OF DESCENDING COLON: ICD-10-CM

## 2022-12-01 DIAGNOSIS — K62.1 RECTAL POLYP: ICD-10-CM

## 2022-12-01 DIAGNOSIS — Z86.010 PERSONAL HISTORY OF COLONIC POLYPS: ICD-10-CM

## 2022-12-01 DIAGNOSIS — K44.9 HIATAL HERNIA: ICD-10-CM

## 2022-12-01 DIAGNOSIS — K64.1 GRADE II INTERNAL HEMORRHOIDS: ICD-10-CM

## 2022-12-01 DIAGNOSIS — R19.8 OTHER SPECIFIED SYMPTOMS AND SIGNS INVOLVING THE DIGESTIVE SYSTEM AND ABDOMEN: ICD-10-CM

## 2022-12-01 DIAGNOSIS — K31.89 OTHER DISEASES OF STOMACH AND DUODENUM: ICD-10-CM

## 2022-12-01 DIAGNOSIS — K29.50 UNSPECIFIED CHRONIC GASTRITIS WITHOUT BLEEDING: ICD-10-CM

## 2022-12-01 DIAGNOSIS — K57.30 DIVERTCULOSIS OF LG INT W/O PERFORATION OR ABSCESS W/O BLEEDING: ICD-10-CM

## 2022-12-01 PROCEDURE — 45380 COLONOSCOPY AND BIOPSY: CPT | Performed by: CLINIC/CENTER

## 2022-12-01 PROCEDURE — 45380 COLONOSCOPY AND BIOPSY: CPT | Performed by: INTERNAL MEDICINE

## 2022-12-01 PROCEDURE — 45385 COLONOSCOPY W/LESION REMOVAL: CPT | Performed by: INTERNAL MEDICINE

## 2022-12-01 PROCEDURE — 45385 COLONOSCOPY W/LESION REMOVAL: CPT | Performed by: CLINIC/CENTER

## 2022-12-01 PROCEDURE — 43239 EGD BIOPSY SINGLE/MULTIPLE: CPT | Performed by: INTERNAL MEDICINE

## 2022-12-01 PROCEDURE — 88305 TISSUE EXAM BY PATHOLOGIST: CPT | Performed by: PATHOLOGY

## 2022-12-01 PROCEDURE — 43239 EGD BIOPSY SINGLE/MULTIPLE: CPT | Performed by: CLINIC/CENTER

## 2022-12-01 RX ORDER — OMEPRAZOLE 40 MG/1
ONCE A DAY CAPSULE, DELAYED RELEASE ORAL ONCE A DAY
Refills: 2 | Status: ACTIVE | COMMUNITY
Start: 2022-06-21

## 2022-12-01 RX ORDER — ALBUTEROL SULFATE 2.5 MG/3ML
SOLUTION RESPIRATORY (INHALATION)
Refills: 0 | Status: ACTIVE | COMMUNITY
Start: 2019-03-26

## 2022-12-01 RX ORDER — GABAPENTIN 300 MG/1
CAPSULE ORAL
Refills: 0 | Status: ACTIVE | COMMUNITY
Start: 2019-03-26

## 2022-12-01 RX ORDER — ACYCLOVIR 200 MG/1
FIVE TIMES A DAY CAPSULE ORAL
Refills: 0 | Status: ACTIVE | COMMUNITY
Start: 2019-03-26

## 2022-12-01 RX ORDER — OXYCODONE AND ACETAMINOPHEN 325; 10 MG/1; MG/1
TABLET ORAL
Refills: 0 | Status: ACTIVE | COMMUNITY
Start: 2019-03-26

## 2022-12-01 RX ORDER — NITROGLYCERIN 0.4 MG/1
PRN TABLET SUBLINGUAL
Refills: 0 | Status: ACTIVE | COMMUNITY
Start: 2019-03-26

## 2022-12-01 RX ORDER — HYDROCHLOROTHIAZIDE 12.5 MG/1
TABLET ORAL
Refills: 0 | Status: ACTIVE | COMMUNITY
Start: 2019-03-26

## 2022-12-01 RX ORDER — CLOTRIMAZOLE 10 MG/G
CREAM TOPICAL TWICE A DAY
Refills: 0 | Status: ACTIVE | COMMUNITY
Start: 2019-03-26

## 2022-12-01 RX ORDER — PREDNISONE 20 MG/1
TABLET ORAL
Refills: 0 | Status: ACTIVE | COMMUNITY
Start: 2019-03-26

## 2022-12-01 RX ORDER — LOSARTAN POTASSIUM 100 MG/1
TABLET, FILM COATED ORAL
Refills: 0 | Status: ACTIVE | COMMUNITY
Start: 2019-03-26

## 2022-12-01 RX ORDER — SIMVASTATIN 20 MG/1
TABLET, FILM COATED ORAL
Refills: 0 | Status: ACTIVE | COMMUNITY
Start: 2019-03-26

## 2022-12-01 RX ORDER — FLECAINIDE ACETATE 50 MG/1
TABLET ORAL
Refills: 0 | Status: ACTIVE | COMMUNITY
Start: 2019-03-26

## 2022-12-01 RX ORDER — FAMOTIDINE 20 MG/1
TABLET ORAL TWICE A DAY
Refills: 0 | Status: ACTIVE | COMMUNITY
Start: 2019-03-26

## 2022-12-01 RX ORDER — PAROXETINE HYDROCHLORIDE 20 MG/1
TABLET, FILM COATED ORAL
Refills: 0 | Status: ACTIVE | COMMUNITY
Start: 2019-03-26

## 2022-12-01 RX ORDER — INSULIN GLARGINE 100 [IU]/ML
24N UNIT DAILY INJECTION, SOLUTION SUBCUTANEOUS
Refills: 0 | Status: ACTIVE | COMMUNITY
Start: 2019-03-26

## 2022-12-01 RX ORDER — OMEPRAZOLE 40 MG/1
1 CAPSULE 30 MINUTES BEFORE MORNING MEAL CAPSULE, DELAYED RELEASE ORAL TWICE A DAY
Qty: 60 | Refills: 1 | Status: ACTIVE | COMMUNITY
Start: 2022-08-23

## 2022-12-01 RX ORDER — OMEPRAZOLE 40 MG/1
1 CAPSULE 30 MINUTES BEFORE MORNING MEAL CAPSULE, DELAYED RELEASE ORAL
Qty: 60 | Refills: 1 | Status: ACTIVE | COMMUNITY

## 2022-12-01 RX ORDER — INSULIN ASPART 100 [IU]/ML
PRN INJECTION, SOLUTION INTRAVENOUS; SUBCUTANEOUS
Refills: 0 | Status: ACTIVE | COMMUNITY
Start: 2019-03-26

## 2022-12-01 RX ORDER — MONTELUKAST 10 MG/1
TABLET, FILM COATED ORAL
Refills: 0 | Status: ACTIVE | COMMUNITY
Start: 2019-03-26

## 2022-12-01 RX ORDER — METHOCARBAMOL 750 MG/1
TABLET, FILM COATED ORAL TWICE A DAY
Refills: 0 | Status: ACTIVE | COMMUNITY
Start: 2018-11-28

## 2022-12-05 PROBLEM — 398050005 DIVERTICULAR DISEASE OF COLON: Status: ACTIVE | Noted: 2022-12-05

## 2022-12-05 PROBLEM — 428283002: Status: ACTIVE | Noted: 2022-12-05

## 2022-12-20 ENCOUNTER — OFFICE VISIT (OUTPATIENT)
Dept: URBAN - METROPOLITAN AREA CLINIC 63 | Facility: CLINIC | Age: 52
End: 2022-12-20

## 2023-01-10 ENCOUNTER — OFFICE VISIT (OUTPATIENT)
Dept: URBAN - METROPOLITAN AREA CLINIC 63 | Facility: CLINIC | Age: 53
End: 2023-01-10

## 2023-05-24 ENCOUNTER — OFFICE VISIT (OUTPATIENT)
Dept: URBAN - METROPOLITAN AREA CLINIC 63 | Facility: CLINIC | Age: 53
End: 2023-05-24

## 2023-07-26 ENCOUNTER — CLAIMS CREATED FROM THE CLAIM WINDOW (OUTPATIENT)
Dept: URBAN - METROPOLITAN AREA MEDICAL CENTER 14 | Facility: MEDICAL CENTER | Age: 53
End: 2023-07-26
Payer: COMMERCIAL

## 2023-07-26 DIAGNOSIS — K57.92 DIVERTICULITIS: ICD-10-CM

## 2023-07-26 DIAGNOSIS — R10.9 ABDOMINAL PAIN: ICD-10-CM

## 2023-07-26 DIAGNOSIS — R93.3 ABN FINDINGS-GI TRACT: ICD-10-CM

## 2023-07-26 DIAGNOSIS — N17.9 ARF (ACUTE RENAL FAILURE): ICD-10-CM

## 2023-07-26 PROCEDURE — 99233 SBSQ HOSP IP/OBS HIGH 50: CPT | Performed by: NURSE PRACTITIONER

## 2023-07-26 PROCEDURE — 99223 1ST HOSP IP/OBS HIGH 75: CPT | Performed by: NURSE PRACTITIONER

## 2023-07-29 ENCOUNTER — CLAIMS CREATED FROM THE CLAIM WINDOW (OUTPATIENT)
Dept: URBAN - METROPOLITAN AREA MEDICAL CENTER 14 | Facility: MEDICAL CENTER | Age: 53
End: 2023-07-29
Payer: COMMERCIAL

## 2023-07-29 DIAGNOSIS — J45.901 ASTHMA EXACERBATION: ICD-10-CM

## 2023-07-29 DIAGNOSIS — K57.92 DIVERTICULITIS: ICD-10-CM

## 2023-07-29 PROCEDURE — 99232 SBSQ HOSP IP/OBS MODERATE 35: CPT | Performed by: INTERNAL MEDICINE

## 2023-08-07 ENCOUNTER — OFFICE VISIT (OUTPATIENT)
Dept: URBAN - METROPOLITAN AREA CLINIC 63 | Facility: CLINIC | Age: 53
End: 2023-08-07

## 2023-08-07 RX ORDER — ALBUTEROL SULFATE 2.5 MG/3ML
SOLUTION RESPIRATORY (INHALATION)
Refills: 0 | Status: ACTIVE | COMMUNITY
Start: 2019-03-26

## 2023-08-07 RX ORDER — INSULIN ASPART 100 [IU]/ML
PRN INJECTION, SOLUTION INTRAVENOUS; SUBCUTANEOUS
Refills: 0 | Status: ACTIVE | COMMUNITY
Start: 2019-03-26

## 2023-08-07 RX ORDER — OXYCODONE AND ACETAMINOPHEN 325; 10 MG/1; MG/1
TABLET ORAL
Refills: 0 | Status: ACTIVE | COMMUNITY
Start: 2019-03-26

## 2023-08-07 RX ORDER — ACYCLOVIR 200 MG/1
FIVE TIMES A DAY CAPSULE ORAL
Refills: 0 | Status: ACTIVE | COMMUNITY
Start: 2019-03-26

## 2023-08-07 RX ORDER — PREDNISONE 20 MG/1
TABLET ORAL
Refills: 0 | Status: ACTIVE | COMMUNITY
Start: 2019-03-26

## 2023-08-07 RX ORDER — FAMOTIDINE 20 MG/1
TABLET ORAL TWICE A DAY
Refills: 0 | Status: ACTIVE | COMMUNITY
Start: 2019-03-26

## 2023-08-07 RX ORDER — LOSARTAN POTASSIUM 100 MG/1
TABLET, FILM COATED ORAL
Refills: 0 | Status: ACTIVE | COMMUNITY
Start: 2019-03-26

## 2023-08-07 RX ORDER — METHOCARBAMOL 750 MG/1
TABLET, FILM COATED ORAL TWICE A DAY
Refills: 0 | Status: ACTIVE | COMMUNITY
Start: 2018-11-28

## 2023-08-07 RX ORDER — GABAPENTIN 300 MG/1
CAPSULE ORAL
Refills: 0 | Status: ACTIVE | COMMUNITY
Start: 2019-03-26

## 2023-08-07 RX ORDER — OMEPRAZOLE 40 MG/1
1 CAPSULE 30 MINUTES BEFORE MORNING MEAL CAPSULE, DELAYED RELEASE ORAL
Qty: 60 | Refills: 1 | Status: ACTIVE | COMMUNITY

## 2023-08-07 RX ORDER — PAROXETINE HYDROCHLORIDE 20 MG/1
TABLET, FILM COATED ORAL
Refills: 0 | Status: ACTIVE | COMMUNITY
Start: 2019-03-26

## 2023-08-07 RX ORDER — OMEPRAZOLE 40 MG/1
1 CAPSULE 30 MINUTES BEFORE MORNING MEAL CAPSULE, DELAYED RELEASE ORAL TWICE A DAY
Qty: 60 | Refills: 1 | Status: ACTIVE | COMMUNITY
Start: 2022-08-23

## 2023-08-07 RX ORDER — NITROGLYCERIN 0.4 MG/1
PRN TABLET SUBLINGUAL
Refills: 0 | Status: ACTIVE | COMMUNITY
Start: 2019-03-26

## 2023-08-07 RX ORDER — INSULIN GLARGINE 100 [IU]/ML
24N UNIT DAILY INJECTION, SOLUTION SUBCUTANEOUS
Refills: 0 | Status: ACTIVE | COMMUNITY
Start: 2019-03-26

## 2023-08-07 RX ORDER — FLECAINIDE ACETATE 50 MG/1
TABLET ORAL
Refills: 0 | Status: ACTIVE | COMMUNITY
Start: 2019-03-26

## 2023-08-07 RX ORDER — MONTELUKAST 10 MG/1
TABLET, FILM COATED ORAL
Refills: 0 | Status: ACTIVE | COMMUNITY
Start: 2019-03-26

## 2023-08-07 RX ORDER — HYDROCHLOROTHIAZIDE 12.5 MG/1
TABLET ORAL
Refills: 0 | Status: ACTIVE | COMMUNITY
Start: 2019-03-26

## 2023-08-07 RX ORDER — OMEPRAZOLE 40 MG/1
ONCE A DAY CAPSULE, DELAYED RELEASE ORAL ONCE A DAY
Refills: 2 | Status: ACTIVE | COMMUNITY
Start: 2022-06-21

## 2023-08-07 RX ORDER — SIMVASTATIN 20 MG/1
TABLET, FILM COATED ORAL
Refills: 0 | Status: ACTIVE | COMMUNITY
Start: 2019-03-26

## 2023-08-07 RX ORDER — CLOTRIMAZOLE 10 MG/G
CREAM TOPICAL TWICE A DAY
Refills: 0 | Status: ACTIVE | COMMUNITY
Start: 2019-03-26

## 2024-01-05 ENCOUNTER — TELEPHONE ENCOUNTER (OUTPATIENT)
Dept: URBAN - METROPOLITAN AREA CLINIC 64 | Facility: CLINIC | Age: 54
End: 2024-01-05

## 2024-01-08 ENCOUNTER — OFFICE VISIT (OUTPATIENT)
Dept: URBAN - METROPOLITAN AREA CLINIC 63 | Facility: CLINIC | Age: 54
End: 2024-01-08

## 2024-01-08 RX ORDER — FAMOTIDINE 20 MG/1
TABLET ORAL TWICE A DAY
Refills: 0 | Status: ACTIVE | COMMUNITY
Start: 2019-03-26

## 2024-01-08 RX ORDER — LOSARTAN POTASSIUM 100 MG/1
TABLET, FILM COATED ORAL
Refills: 0 | Status: ACTIVE | COMMUNITY
Start: 2019-03-26

## 2024-01-08 RX ORDER — NITROGLYCERIN 0.4 MG/1
PRN TABLET SUBLINGUAL
Refills: 0 | Status: ACTIVE | COMMUNITY
Start: 2019-03-26

## 2024-01-08 RX ORDER — FLECAINIDE ACETATE 50 MG/1
TABLET ORAL
Refills: 0 | Status: ACTIVE | COMMUNITY
Start: 2019-03-26

## 2024-01-08 RX ORDER — MONTELUKAST 10 MG/1
TABLET, FILM COATED ORAL
Refills: 0 | Status: ACTIVE | COMMUNITY
Start: 2019-03-26

## 2024-01-08 RX ORDER — OMEPRAZOLE 40 MG/1
1 CAPSULE 30 MINUTES BEFORE MORNING MEAL CAPSULE, DELAYED RELEASE ORAL TWICE A DAY
Qty: 60 | Refills: 1 | Status: ACTIVE | COMMUNITY
Start: 2022-08-23

## 2024-01-08 RX ORDER — PREDNISONE 20 MG/1
TABLET ORAL
Refills: 0 | Status: ACTIVE | COMMUNITY
Start: 2019-03-26

## 2024-01-08 RX ORDER — ALBUTEROL SULFATE 2.5 MG/3ML
SOLUTION RESPIRATORY (INHALATION)
Refills: 0 | Status: ACTIVE | COMMUNITY
Start: 2019-03-26

## 2024-01-08 RX ORDER — INSULIN ASPART 100 [IU]/ML
PRN INJECTION, SOLUTION INTRAVENOUS; SUBCUTANEOUS
Refills: 0 | Status: ACTIVE | COMMUNITY
Start: 2019-03-26

## 2024-01-08 RX ORDER — OMEPRAZOLE 40 MG/1
1 CAPSULE 30 MINUTES BEFORE MORNING MEAL CAPSULE, DELAYED RELEASE ORAL
Qty: 60 | Refills: 1 | Status: ACTIVE | COMMUNITY

## 2024-01-08 RX ORDER — SIMVASTATIN 20 MG/1
TABLET, FILM COATED ORAL
Refills: 0 | Status: ACTIVE | COMMUNITY
Start: 2019-03-26

## 2024-01-08 RX ORDER — ACYCLOVIR 200 MG/1
FIVE TIMES A DAY CAPSULE ORAL
Refills: 0 | Status: ACTIVE | COMMUNITY
Start: 2019-03-26

## 2024-01-08 RX ORDER — INSULIN GLARGINE 100 [IU]/ML
24N UNIT DAILY INJECTION, SOLUTION SUBCUTANEOUS
Refills: 0 | Status: ACTIVE | COMMUNITY
Start: 2019-03-26

## 2024-01-08 RX ORDER — PAROXETINE HYDROCHLORIDE 20 MG/1
TABLET, FILM COATED ORAL
Refills: 0 | Status: ACTIVE | COMMUNITY
Start: 2019-03-26

## 2024-01-08 RX ORDER — GABAPENTIN 300 MG/1
CAPSULE ORAL
Refills: 0 | Status: ACTIVE | COMMUNITY
Start: 2019-03-26

## 2024-01-08 RX ORDER — HYDROCHLOROTHIAZIDE 12.5 MG/1
TABLET ORAL
Refills: 0 | Status: ACTIVE | COMMUNITY
Start: 2019-03-26

## 2024-01-08 RX ORDER — OXYCODONE AND ACETAMINOPHEN 325; 10 MG/1; MG/1
TABLET ORAL
Refills: 0 | Status: ACTIVE | COMMUNITY
Start: 2019-03-26

## 2024-01-08 RX ORDER — CLOTRIMAZOLE 10 MG/G
CREAM TOPICAL TWICE A DAY
Refills: 0 | Status: ACTIVE | COMMUNITY
Start: 2019-03-26

## 2024-01-08 RX ORDER — OMEPRAZOLE 40 MG/1
ONCE A DAY CAPSULE, DELAYED RELEASE ORAL ONCE A DAY
Refills: 2 | Status: ACTIVE | COMMUNITY
Start: 2022-06-21

## 2024-01-08 RX ORDER — METHOCARBAMOL 750 MG/1
TABLET, FILM COATED ORAL TWICE A DAY
Refills: 0 | Status: ACTIVE | COMMUNITY
Start: 2018-11-28

## 2024-04-08 ENCOUNTER — OV EP (OUTPATIENT)
Dept: URBAN - METROPOLITAN AREA CLINIC 63 | Facility: CLINIC | Age: 54
End: 2024-04-08
Payer: COMMERCIAL

## 2024-04-08 VITALS
HEIGHT: 61 IN | BODY MASS INDEX: 25.49 KG/M2 | SYSTOLIC BLOOD PRESSURE: 90 MMHG | OXYGEN SATURATION: 97 % | TEMPERATURE: 97.3 F | WEIGHT: 135 LBS | HEART RATE: 71 BPM | DIASTOLIC BLOOD PRESSURE: 67 MMHG

## 2024-04-08 DIAGNOSIS — K29.50 OTHER CHRONIC GASTRITIS WITHOUT HEMORRHAGE: ICD-10-CM

## 2024-04-08 DIAGNOSIS — K21.9 GASTROESOPHAGEAL REFLUX DISEASE WITHOUT ESOPHAGITIS: ICD-10-CM

## 2024-04-08 DIAGNOSIS — K57.92 DIVERTICULITIS: ICD-10-CM

## 2024-04-08 PROBLEM — 8493009: Status: ACTIVE | Noted: 2024-04-08

## 2024-04-08 PROBLEM — 266435005: Status: ACTIVE | Noted: 2024-04-08

## 2024-04-08 PROCEDURE — 99204 OFFICE O/P NEW MOD 45 MIN: CPT | Performed by: NURSE PRACTITIONER

## 2024-04-08 RX ORDER — OMEPRAZOLE 40 MG/1
ONCE A DAY CAPSULE, DELAYED RELEASE ORAL ONCE A DAY
Refills: 2 | Status: DISCONTINUED | COMMUNITY
Start: 2022-06-21

## 2024-04-08 RX ORDER — DEXLANSOPRAZOLE 60 MG/1
1 CAPSULE CAPSULE, DELAYED RELEASE ORAL ONCE A DAY
Qty: 30 | OUTPATIENT
Start: 2024-04-08

## 2024-04-08 RX ORDER — OMEPRAZOLE 40 MG/1
1 CAPSULE 30 MINUTES BEFORE MORNING MEAL CAPSULE, DELAYED RELEASE ORAL TWICE A DAY
Qty: 60 | Refills: 1 | Status: DISCONTINUED | COMMUNITY
Start: 2022-08-23

## 2024-04-08 RX ORDER — PAROXETINE HYDROCHLORIDE 20 MG/1
TABLET, FILM COATED ORAL
Refills: 0 | Status: DISCONTINUED | COMMUNITY
Start: 2019-03-26

## 2024-04-08 RX ORDER — ALBUTEROL SULFATE 2.5 MG/3ML
SOLUTION RESPIRATORY (INHALATION)
Refills: 0 | Status: ACTIVE | COMMUNITY
Start: 2019-03-26

## 2024-04-08 RX ORDER — INSULIN GLARGINE 100 [IU]/ML
24N UNIT DAILY INJECTION, SOLUTION SUBCUTANEOUS
Refills: 0 | Status: DISCONTINUED | COMMUNITY
Start: 2019-03-26

## 2024-04-08 RX ORDER — PREDNISONE 20 MG/1
TABLET ORAL
Refills: 0 | Status: ACTIVE | COMMUNITY
Start: 2019-03-26

## 2024-04-08 RX ORDER — ACYCLOVIR 200 MG/1
FIVE TIMES A DAY CAPSULE ORAL
Refills: 0 | Status: DISCONTINUED | COMMUNITY
Start: 2019-03-26

## 2024-04-08 RX ORDER — SIMVASTATIN 20 MG/1
TABLET, FILM COATED ORAL
Refills: 0 | Status: ACTIVE | COMMUNITY
Start: 2019-03-26

## 2024-04-08 RX ORDER — HYDROCHLOROTHIAZIDE 12.5 MG/1
TABLET ORAL
Refills: 0 | Status: ACTIVE | COMMUNITY
Start: 2019-03-26

## 2024-04-08 RX ORDER — CLOTRIMAZOLE 10 MG/G
CREAM TOPICAL TWICE A DAY
Refills: 0 | Status: ACTIVE | COMMUNITY
Start: 2019-03-26

## 2024-04-08 RX ORDER — INSULIN ASPART 100 [IU]/ML
PRN INJECTION, SOLUTION INTRAVENOUS; SUBCUTANEOUS
Refills: 0 | Status: ACTIVE | COMMUNITY
Start: 2019-03-26

## 2024-04-08 RX ORDER — ARIPIPRAZOLE 2 MG/1
TABLET ORAL
Qty: 90 TABLET | Status: ACTIVE | COMMUNITY

## 2024-04-08 RX ORDER — METOPROLOL TARTRATE 25 MG/1
TABLET, FILM COATED ORAL
Qty: 90 TABLET | Status: ACTIVE | COMMUNITY

## 2024-04-08 RX ORDER — INSULIN GLARGINE 300 U/ML
INJECTION, SOLUTION SUBCUTANEOUS
Qty: 12 MILLILITER | Status: ACTIVE | COMMUNITY

## 2024-04-08 RX ORDER — METHOCARBAMOL 750 MG/1
TABLET, FILM COATED ORAL TWICE A DAY
Refills: 0 | Status: DISCONTINUED | COMMUNITY
Start: 2018-11-28

## 2024-04-08 RX ORDER — MONTELUKAST 10 MG/1
TABLET, FILM COATED ORAL
Refills: 0 | Status: ACTIVE | COMMUNITY
Start: 2019-03-26

## 2024-04-08 RX ORDER — FLECAINIDE ACETATE 50 MG/1
TABLET ORAL
Refills: 0 | Status: ACTIVE | COMMUNITY
Start: 2019-03-26

## 2024-04-08 RX ORDER — INSULIN PMP CART,AUT,G6/7,CNTR
CHANGE POD EVERY 48 HOURS AS DIRECTED EACH SUBCUTANEOUS
Qty: 1 EACH | Refills: 0 | Status: ACTIVE | COMMUNITY

## 2024-04-08 RX ORDER — OMEPRAZOLE 40 MG/1
1 CAPSULE 30 MINUTES BEFORE MORNING MEAL CAPSULE, DELAYED RELEASE ORAL
Qty: 60 | Refills: 1 | Status: DISCONTINUED | COMMUNITY

## 2024-04-08 RX ORDER — NITROGLYCERIN 0.4 MG/1
PRN TABLET SUBLINGUAL
Refills: 0 | Status: ACTIVE | COMMUNITY
Start: 2019-03-26

## 2024-04-08 RX ORDER — INSULIN DETEMIR 100 [IU]/ML
INJECT 10 UNITS UNDER THE SKIN NIGHTLY INJECTION, SOLUTION SUBCUTANEOUS
Qty: 3 MILLILITER | Refills: 0 | Status: ACTIVE | COMMUNITY

## 2024-04-08 RX ORDER — FAMOTIDINE 20 MG/1
TABLET ORAL TWICE A DAY
Refills: 0 | Status: ACTIVE | COMMUNITY
Start: 2019-03-26

## 2024-04-08 RX ORDER — MONTELUKAST SODIUM 10 MG/1
TABLET, FILM COATED ORAL
Qty: 90 TABLET | Status: ACTIVE | COMMUNITY

## 2024-04-08 RX ORDER — SUCRALFATE 1 G/1
1 TABLET ON AN EMPTY STOMACH TABLET ORAL TWICE A DAY
Qty: 60 | OUTPATIENT
Start: 2024-04-08 | End: 2024-05-08

## 2024-04-08 RX ORDER — GABAPENTIN 300 MG/1
CAPSULE ORAL
Refills: 0 | Status: ACTIVE | COMMUNITY
Start: 2019-03-26

## 2024-04-08 RX ORDER — DILTIAZEM HYDROCHLORIDE 360 MG/1
CAPSULE, EXTENDED RELEASE ORAL
Qty: 30 CAPSULE | Status: ACTIVE | COMMUNITY

## 2024-04-08 RX ORDER — OXYCODONE AND ACETAMINOPHEN 325; 10 MG/1; MG/1
TABLET ORAL
Refills: 0 | Status: ACTIVE | COMMUNITY
Start: 2019-03-26

## 2024-04-08 RX ORDER — LOSARTAN POTASSIUM 100 MG/1
TABLET, FILM COATED ORAL
Refills: 0 | Status: ACTIVE | COMMUNITY
Start: 2019-03-26

## 2024-04-08 NOTE — HPI-TODAY'S VISIT:
Patient seen today via telehealth by agreement and consent of patient in light of current COVID-19 pandemic. I used video conferencing during the visit. The patient encounter is appropriate and reasonable under the circumstances given the patient's particular presentation at this time. The patient has been advised of the followin) the potential risks and limitations of this mode of treatment (including but not limited to the absence of in-person examination); 2) the right to refuse telehealth services at any point without affecting the right to future care; 3) the right to receive in-person services, included immediately after this consultation if an urgent need arises; 4) information, including identifiable images or information from this telehealth consult, will only be shared in accordance with HIPAA regulations. Any and all the patient's and/or patient's family member's questions on this issue have been answered. The patient has verbally consented to be treated via telehealth services. The patient has also been advised to contact this office for worsening conditions or problems, and seek emergency medical treatment and/or call 911 if the patient deems either necessary.   More than half of the face-to-face time used for counseling and coordination of care.  The patient received telehealth services at: home  Patient with chronic GERD for the last year  with dysphagia for the last 2 years is intermittent and to solid and liquid.  Patient taking Dexilant 60 mg PO daily working better patient on prednisone  because of asthma will do EGD with possible dilation will need to r/o esophagitis.  : Patient with evidence of reflux and gastritis positive for bacteria likely H pylori will start triple treatment.  Patient denies allergies to PNC will start amoxicillin and clarithromycin. Will follow in 2 months.  : Patient had triple treatment but patient stopped omeprazole after 14 days, Patient with epigastric pain and occasional vomiting.  Patient need to start treatment. With PPI and will start Carafate.   Will follow in 3 months will need an evaluation by Psychiatry states try to take her life, I ask her today, and she denies any suicidal ideation at this moment but need a psychiatry evaluation as soon as possible. Patient looks stable I called Dr Samy Holguin who will see the patient soon. Patient denies any suicidal ideation and states she needs to live because her family depend on her.   10/14: Patient persist with GERD and epigastric pain, the insurance did not cover the PPI. Will start pantoprazole and will follow in 2 months. Will do start also probiotics.  : Patient with GERD well control with PPI, will try again with the insurance.   With some improvement with probiotics, will continue treatment.  Will follow as needed basis.  5/15: patient state was doing well but  since came from [place] has dyspepsia and diarrhea will do stool studies and will start probiotics, will follow in 2 weeks.  7/15: patient 2 months after last visit, stool studies were negative, Patient  with reflux and epigastric pain with no treatment for the last month will restart omeprazole 20 mg once a day. Will follow in 3 months.  10/15: Patient feels better, on omeprazole. As per patient denies GI symptoms on omeprazole,  her insurance does not give her PPI.  Patient needs to continue PPI daily for now will use low dose of 20 mg once a day. Patient with occasional loose stool will start probiotics.  3/17:  Patient comes after more than [place] year doing well until she went to [place]  with diarrhea and abdominal pain, now doing better, diarrhea was resolved, will do EGD as per patient was evaluated in [place] and was told that had a gastric mass. Will do EGD. Patient with rectal irritation Will start topical treatment Will consider colonoscopy. Patient is with [employer]. Discussion [Use for free text]. Discussed the need for appropriate follow-up care.  Patient will be placed in our recall system and a letter will be mailed to the patient. Discussed dietary restrictions pertaining to Gastritis Information sheet reviewed and a copy provided.    Patient with symptoms of gastritis and reflux and dysphagia. EGD with evidence of esophageal moniliasis and chronic gastritis. Colonoscopy with evidence of one tubular adenoma and two hyperplastic polyps and internal hemorrhoids. Abdomen US with evidence of fatty liver and hepatomegaly. Plan Patient Hx of chronic asthma on steroids' inhaler educate to rinse her moth several times after each treatment and not swallow that water. Patient will do treatment with fluconazole. Carafate, and PPI. Colon recall in 5 year.  Patient with Hx of  esophagitis by Candida, today with the complaints of asthma attack, on chronic use of steroids, diabetes, hypertension. She said symptoms of gastritis, reflux, and dysphagia are better. Patient will continue with PPI and Carafate. Will evaluate her and next visit for EGD if symptoms do not improve. Also, Patient with medical history of a recent ultrasound with evidence of hepatomegaly and fatty liver, Liver enzymes are normal Lipid panel is normal. She will have Hepatitis pane, HgbA1c, and AFP.  : Patient had labs with positive AB for HAV, HCV and HBV are negative. Patient with recent infection with Pseudomonas with pneumonia that need to be in ICU few months ago. Patient with fatty liver. Will need diet and weight loss. Will need to decrease steroids if she is possible, Will continue with similar treatment, will do Labs and ultrasound in 6 months.  Patient here complaining of resent admission to the hospital due to diverticulitis. Today with complaints of diarrhea, no blood or mucus, no fever, discomfort over the LLQ. Patient with history of fatty liver. Also complain t of Reflux and gastritis with epigastric pain. Patient in long term use of steroids. Her last EGD and colonoscopy was a year ago. Patient will do plan below, she was instructed to call to this office or go to ER Department if any change on her health status. 3/19 Patient here after being discharge from the hospital with respiratory failure and lower back surgery s/p treatment with antibiotics for MRSA and pseudomonas infection. Today complaining of acid reflux and gastritis symptoms. Labs done two months ago are negative for hepatitis, elevated AST. Patient will do diet she will continue with Dexilant, and will start Ranitidine at bedtime for one month.  : Patient comes after UTI and PNA one month ago, now doing better, will have neck surgery. Will follow in 3 months, and will consider EGD and will do short trial with Dexilant for her reflux. Will need to continue well control of her glucose. Will try to decrease dose of PPI.   : Patient had neck surgery as per patient had stroke with left hemiparesis that resolved. As per patient was related to stress, will need records from neurology. Patient doing well on Dexilant. Patient will need cardiac clearance before EGD.  CT scan of abdomen and pelvis done in 2020 with impression of fundoplication, status post cholecystectomy, small benign left renal cyst, otherwise unremarkable. : Patient needed a repeat surgery in her back, in her lower back, patient with bloating and abdominal pain in the lower extremity. Will plan CT scan of abdomen and pelvic and will plan colonoscopy in next visit in 2 months. Will start PPI. If Ct scan is normal will plan colonoscopy, patient with personal h/o colon polyps.  : Patient had a CT scan with evidence of no acute on normalities in the abdomen or pelvis, there is mild hepatomegaly with diffuse hepatic steatosis, will need to do work-up for fatty liver, small hiatal hernia, s/p cholecystectomy, s/p hysterectomy, 1 cm simple cyst in the left kidney, diverticulosis and is s/p L5-S1 fusion.  Patient complaining of heartburn that is more severe even taking omeprazole once a day, will increase dose to omeprazole twice a day and will do an upper endoscopy and colonoscopy.   Patient here today in good general state.  She is here complaining of having symptom of gastritis and reflux.  Her symptoms are chronic but now seems to be worse.  She is requesting to have a prescription for Dexilant.  Patient also mentioning that she has surgery/small portion of the left colon resected recently due to diverticulitis done by Dr. Melendez.  She also states she will have colonoscopy with this doctor in couple weeks.Patient will resume diet and treatment with Dexilant and Carafate for her gastritis.

## 2024-07-17 ENCOUNTER — DASHBOARD ENCOUNTERS (OUTPATIENT)
Age: 54
End: 2024-07-17

## 2024-07-22 ENCOUNTER — OFFICE VISIT (OUTPATIENT)
Dept: URBAN - METROPOLITAN AREA CLINIC 63 | Facility: CLINIC | Age: 54
End: 2024-07-22

## 2024-07-22 RX ORDER — PREDNISONE 20 MG/1
TABLET ORAL
Refills: 0 | Status: ACTIVE | COMMUNITY
Start: 2019-03-26

## 2024-07-22 RX ORDER — DEXLANSOPRAZOLE 60 MG/1
TAKE 1 CAPSULE BY MOUTH DAILY CAPSULE, DELAYED RELEASE PELLETS ORAL
Qty: 90 CAPSULE | Refills: 0 | Status: ACTIVE | COMMUNITY

## 2024-07-22 RX ORDER — DILTIAZEM HYDROCHLORIDE 360 MG/1
CAPSULE, EXTENDED RELEASE ORAL
Qty: 30 CAPSULE | Status: ACTIVE | COMMUNITY

## 2024-07-22 RX ORDER — METOPROLOL TARTRATE 25 MG/1
TABLET, FILM COATED ORAL
Qty: 90 TABLET | Status: ACTIVE | COMMUNITY

## 2024-07-22 RX ORDER — MONTELUKAST 10 MG/1
TABLET, FILM COATED ORAL
Refills: 0 | Status: ACTIVE | COMMUNITY
Start: 2019-03-26

## 2024-07-22 RX ORDER — NITROGLYCERIN 0.4 MG/1
PRN TABLET SUBLINGUAL
Refills: 0 | Status: ACTIVE | COMMUNITY
Start: 2019-03-26

## 2024-07-22 RX ORDER — SIMVASTATIN 20 MG/1
TABLET, FILM COATED ORAL
Refills: 0 | Status: ACTIVE | COMMUNITY
Start: 2019-03-26

## 2024-07-22 RX ORDER — ALBUTEROL SULFATE 2.5 MG/3ML
SOLUTION RESPIRATORY (INHALATION)
Refills: 0 | Status: ACTIVE | COMMUNITY
Start: 2019-03-26

## 2024-07-22 RX ORDER — OXYCODONE AND ACETAMINOPHEN 325; 10 MG/1; MG/1
TABLET ORAL
Refills: 0 | Status: ACTIVE | COMMUNITY
Start: 2019-03-26

## 2024-07-22 RX ORDER — ARIPIPRAZOLE 2 MG/1
TABLET ORAL
Qty: 90 TABLET | Status: ACTIVE | COMMUNITY

## 2024-07-22 RX ORDER — FAMOTIDINE 20 MG/1
TABLET ORAL TWICE A DAY
Refills: 0 | Status: ACTIVE | COMMUNITY
Start: 2019-03-26

## 2024-07-22 RX ORDER — HYDROCHLOROTHIAZIDE 12.5 MG/1
TABLET ORAL
Refills: 0 | Status: ACTIVE | COMMUNITY
Start: 2019-03-26

## 2024-07-22 RX ORDER — LOSARTAN POTASSIUM 100 MG/1
TABLET, FILM COATED ORAL
Refills: 0 | Status: ACTIVE | COMMUNITY
Start: 2019-03-26

## 2024-07-22 RX ORDER — GABAPENTIN 300 MG/1
CAPSULE ORAL
Refills: 0 | Status: ACTIVE | COMMUNITY
Start: 2019-03-26

## 2024-07-22 RX ORDER — INSULIN GLARGINE 300 U/ML
INJECTION, SOLUTION SUBCUTANEOUS
Qty: 12 MILLILITER | Status: ACTIVE | COMMUNITY

## 2024-07-22 RX ORDER — CLOTRIMAZOLE 10 MG/G
CREAM TOPICAL TWICE A DAY
Refills: 0 | Status: ACTIVE | COMMUNITY
Start: 2019-03-26

## 2024-07-22 RX ORDER — MONTELUKAST SODIUM 10 MG/1
TABLET, FILM COATED ORAL
Qty: 90 TABLET | Status: ACTIVE | COMMUNITY

## 2024-07-22 RX ORDER — INSULIN PMP CART,AUT,G6/7,CNTR
CHANGE POD EVERY 48 HOURS AS DIRECTED EACH SUBCUTANEOUS
Qty: 1 EACH | Refills: 0 | Status: ACTIVE | COMMUNITY

## 2024-07-22 RX ORDER — INSULIN DETEMIR 100 [IU]/ML
INJECT 10 UNITS UNDER THE SKIN NIGHTLY INJECTION, SOLUTION SUBCUTANEOUS
Qty: 3 MILLILITER | Refills: 0 | Status: ACTIVE | COMMUNITY

## 2024-07-22 RX ORDER — INSULIN ASPART 100 [IU]/ML
PRN INJECTION, SOLUTION INTRAVENOUS; SUBCUTANEOUS
Refills: 0 | Status: ACTIVE | COMMUNITY
Start: 2019-03-26

## 2024-07-22 RX ORDER — FLECAINIDE ACETATE 50 MG/1
TABLET ORAL
Refills: 0 | Status: ACTIVE | COMMUNITY
Start: 2019-03-26

## 2024-07-22 RX ORDER — SUCRALFATE 1 G/1
TAKE 1 TABLET BY MOUTH TWICE DAILY ON AN EMPTY STOMACH TABLET ORAL
Qty: 180 TABLET | Refills: 0 | Status: ACTIVE | COMMUNITY

## 2025-06-23 ENCOUNTER — OFFICE VISIT (OUTPATIENT)
Dept: URBAN - METROPOLITAN AREA CLINIC 63 | Facility: CLINIC | Age: 55
End: 2025-06-23

## 2025-06-23 RX ORDER — INSULIN PMP CART,AUT,G6/7,CNTR
CHANGE POD EVERY 48 HOURS AS DIRECTED EACH SUBCUTANEOUS
Qty: 1 EACH | Refills: 0 | Status: ACTIVE | COMMUNITY

## 2025-06-23 RX ORDER — NITROGLYCERIN 0.4 MG/1
PRN TABLET SUBLINGUAL
Refills: 0 | Status: ACTIVE | COMMUNITY
Start: 2019-03-26

## 2025-06-23 RX ORDER — DEXLANSOPRAZOLE 60 MG/1
TAKE 1 CAPSULE BY MOUTH DAILY CAPSULE, DELAYED RELEASE PELLETS ORAL
Qty: 90 CAPSULE | Refills: 0 | Status: ACTIVE | COMMUNITY

## 2025-06-23 RX ORDER — DILTIAZEM HYDROCHLORIDE 360 MG/1
CAPSULE, EXTENDED RELEASE ORAL
Qty: 30 CAPSULE | Status: ACTIVE | COMMUNITY

## 2025-06-23 RX ORDER — GABAPENTIN 300 MG/1
CAPSULE ORAL
Refills: 0 | Status: ACTIVE | COMMUNITY
Start: 2019-03-26

## 2025-06-23 RX ORDER — ALBUTEROL SULFATE 2.5 MG/3ML
SOLUTION RESPIRATORY (INHALATION)
Refills: 0 | Status: ACTIVE | COMMUNITY
Start: 2019-03-26

## 2025-06-23 RX ORDER — SIMVASTATIN 20 MG/1
TABLET, FILM COATED ORAL
Refills: 0 | Status: ACTIVE | COMMUNITY
Start: 2019-03-26

## 2025-06-23 RX ORDER — INSULIN ASPART 100 [IU]/ML
PRN INJECTION, SOLUTION INTRAVENOUS; SUBCUTANEOUS
Refills: 0 | Status: ACTIVE | COMMUNITY
Start: 2019-03-26

## 2025-06-23 RX ORDER — FLECAINIDE ACETATE 50 MG/1
TABLET ORAL
Refills: 0 | Status: ACTIVE | COMMUNITY
Start: 2019-03-26

## 2025-06-23 RX ORDER — CLOTRIMAZOLE 10 MG/G
CREAM TOPICAL TWICE A DAY
Refills: 0 | Status: ACTIVE | COMMUNITY
Start: 2019-03-26

## 2025-06-23 RX ORDER — PREDNISONE 20 MG/1
TABLET ORAL
Refills: 0 | Status: ACTIVE | COMMUNITY
Start: 2019-03-26

## 2025-06-23 RX ORDER — FAMOTIDINE 20 MG/1
TABLET ORAL TWICE A DAY
Refills: 0 | Status: ACTIVE | COMMUNITY
Start: 2019-03-26

## 2025-06-23 RX ORDER — HYDROCHLOROTHIAZIDE 12.5 MG/1
TABLET ORAL
Refills: 0 | Status: ACTIVE | COMMUNITY
Start: 2019-03-26

## 2025-06-23 RX ORDER — MONTELUKAST SODIUM 10 MG/1
TABLET, FILM COATED ORAL
Qty: 90 TABLET | Status: ACTIVE | COMMUNITY

## 2025-06-23 RX ORDER — METOPROLOL TARTRATE 25 MG/1
TABLET, FILM COATED ORAL
Qty: 90 TABLET | Status: ACTIVE | COMMUNITY

## 2025-06-23 RX ORDER — SUCRALFATE 1 G/1
TAKE 1 TABLET BY MOUTH TWICE DAILY ON AN EMPTY STOMACH TABLET ORAL
Qty: 180 TABLET | Refills: 0 | Status: ACTIVE | COMMUNITY

## 2025-06-23 RX ORDER — OXYCODONE AND ACETAMINOPHEN 325; 10 MG/1; MG/1
TABLET ORAL
Refills: 0 | Status: ACTIVE | COMMUNITY
Start: 2019-03-26

## 2025-06-23 RX ORDER — INSULIN GLARGINE 300 U/ML
INJECTION, SOLUTION SUBCUTANEOUS
Qty: 12 MILLILITER | Status: ACTIVE | COMMUNITY

## 2025-06-23 RX ORDER — INSULIN DETEMIR 100 [IU]/ML
INJECT 10 UNITS UNDER THE SKIN NIGHTLY INJECTION, SOLUTION SUBCUTANEOUS
Qty: 3 MILLILITER | Refills: 0 | Status: ACTIVE | COMMUNITY

## 2025-06-23 RX ORDER — MONTELUKAST 10 MG/1
TABLET, FILM COATED ORAL
Refills: 0 | Status: ACTIVE | COMMUNITY
Start: 2019-03-26

## 2025-06-23 RX ORDER — LOSARTAN POTASSIUM 100 MG/1
TABLET, FILM COATED ORAL
Refills: 0 | Status: ACTIVE | COMMUNITY
Start: 2019-03-26

## 2025-06-23 RX ORDER — ARIPIPRAZOLE 2 MG/1
TABLET ORAL
Qty: 90 TABLET | Status: ACTIVE | COMMUNITY

## 2025-08-11 ENCOUNTER — OFFICE VISIT (OUTPATIENT)
Dept: URBAN - METROPOLITAN AREA CLINIC 63 | Facility: CLINIC | Age: 55
End: 2025-08-11
Payer: COMMERCIAL

## 2025-08-11 DIAGNOSIS — K21.9 ACID REFLUX: ICD-10-CM

## 2025-08-11 DIAGNOSIS — K44.9 HIATAL HERNIA: ICD-10-CM

## 2025-08-11 DIAGNOSIS — K64.1 GRADE II HEMORRHOIDS: ICD-10-CM

## 2025-08-11 PROCEDURE — 99214 OFFICE O/P EST MOD 30 MIN: CPT | Performed by: NURSE PRACTITIONER

## 2025-08-11 RX ORDER — INSULIN PMP CART,AUT,G6/7,CNTR
CHANGE POD EVERY 48 HOURS AS DIRECTED EACH SUBCUTANEOUS
Qty: 1 EACH | Refills: 0 | Status: ACTIVE | COMMUNITY

## 2025-08-11 RX ORDER — DILTIAZEM HYDROCHLORIDE 360 MG/1
CAPSULE, EXTENDED RELEASE ORAL
Qty: 30 CAPSULE | Status: ACTIVE | COMMUNITY

## 2025-08-11 RX ORDER — SIMVASTATIN 20 MG/1
TABLET, FILM COATED ORAL
Refills: 0 | Status: ACTIVE | COMMUNITY
Start: 2019-03-26

## 2025-08-11 RX ORDER — FLECAINIDE ACETATE 50 MG/1
TABLET ORAL
Refills: 0 | Status: ACTIVE | COMMUNITY
Start: 2019-03-26

## 2025-08-11 RX ORDER — MONTELUKAST 10 MG/1
TABLET, FILM COATED ORAL
Refills: 0 | Status: ACTIVE | COMMUNITY
Start: 2019-03-26

## 2025-08-11 RX ORDER — LOSARTAN POTASSIUM 100 MG/1
TABLET, FILM COATED ORAL
Refills: 0 | Status: ACTIVE | COMMUNITY
Start: 2019-03-26

## 2025-08-11 RX ORDER — INSULIN LISPRO 100 [IU]/ML
AS DIRECTED INJECTION, SOLUTION INTRAVENOUS; SUBCUTANEOUS
Status: ACTIVE | COMMUNITY
Start: 2025-08-11

## 2025-08-11 RX ORDER — METOPROLOL TARTRATE 25 MG/1
TABLET, FILM COATED ORAL
Qty: 90 TABLET | Status: ACTIVE | COMMUNITY

## 2025-08-11 RX ORDER — PREDNISONE 20 MG/1
TABLET ORAL
Refills: 0 | Status: ACTIVE | COMMUNITY
Start: 2019-03-26

## 2025-08-11 RX ORDER — INSULIN DETEMIR 100 [IU]/ML
INJECT 10 UNITS UNDER THE SKIN NIGHTLY INJECTION, SOLUTION SUBCUTANEOUS
Qty: 3 MILLILITER | Refills: 0 | Status: ACTIVE | COMMUNITY

## 2025-08-11 RX ORDER — FAMOTIDINE 40 MG/1
1 TABLET TABLET, FILM COATED ORAL ONCE A DAY
Qty: 90 TABLET | Refills: 0 | OUTPATIENT
Start: 2025-08-11

## 2025-08-11 RX ORDER — HYDROCHLOROTHIAZIDE 12.5 MG/1
TABLET ORAL
Refills: 0 | Status: ACTIVE | COMMUNITY
Start: 2019-03-26

## 2025-08-11 RX ORDER — OXYCODONE AND ACETAMINOPHEN 325; 10 MG/1; MG/1
TABLET ORAL
Refills: 0 | Status: ACTIVE | COMMUNITY
Start: 2019-03-26

## 2025-08-11 RX ORDER — ALBUTEROL SULFATE 2.5 MG/3ML
SOLUTION RESPIRATORY (INHALATION)
Refills: 0 | Status: ACTIVE | COMMUNITY
Start: 2019-03-26

## 2025-08-11 RX ORDER — GABAPENTIN 300 MG/1
CAPSULE ORAL
Refills: 0 | Status: ACTIVE | COMMUNITY
Start: 2019-03-26

## 2025-08-11 RX ORDER — LIDOCAINE AND MINERAL OIL AND PHENYLEPHRINE HCL AND WHITE PETROLATUM 50; 170; 2.5; 39 MG/G; MG/G; MG/G; MG/G
AS DIRECTED CREAM TOPICAL TWICE A DAY
Qty: 30 GM | Refills: 0 | OUTPATIENT
Start: 2025-08-11 | End: 2025-09-10